# Patient Record
Sex: MALE | Race: BLACK OR AFRICAN AMERICAN | NOT HISPANIC OR LATINO | Employment: UNEMPLOYED | URBAN - METROPOLITAN AREA
[De-identification: names, ages, dates, MRNs, and addresses within clinical notes are randomized per-mention and may not be internally consistent; named-entity substitution may affect disease eponyms.]

---

## 2020-09-16 ENCOUNTER — HISTORICAL (OUTPATIENT)
Dept: FAMILY MEDICINE | Facility: CLINIC | Age: 45
End: 2020-09-16

## 2020-09-16 LAB
ABS NEUT (OLG): 2.86 X10(3)/MCL (ref 2.1–9.2)
ALBUMIN SERPL-MCNC: 3.8 GM/DL (ref 3.4–5)
ALBUMIN/GLOB SERPL: 1 RATIO (ref 1.1–2)
ALP SERPL-CCNC: 76 UNIT/L (ref 45–117)
ALT SERPL-CCNC: 39 UNIT/L (ref 12–78)
APPEARANCE, UA: CLEAR
AST SERPL-CCNC: 9 UNIT/L (ref 15–37)
BACTERIA #/AREA URNS AUTO: ABNORMAL /HPF
BASOPHILS # BLD AUTO: 0 X10(3)/MCL (ref 0–0.2)
BASOPHILS NFR BLD AUTO: 0 %
BILIRUB SERPL-MCNC: 0.3 MG/DL (ref 0.2–1)
BILIRUB UR QL STRIP: NEGATIVE
BILIRUBIN DIRECT+TOT PNL SERPL-MCNC: <0.1 MG/DL (ref 0–0.2)
BILIRUBIN DIRECT+TOT PNL SERPL-MCNC: ABNORMAL MG/DL
BUN SERPL-MCNC: 8 MG/DL (ref 7–18)
CALCIUM SERPL-MCNC: 9.2 MG/DL (ref 8.5–10.1)
CHLORIDE SERPL-SCNC: 105 MMOL/L (ref 98–107)
CHOLEST SERPL-MCNC: 261 MG/DL
CHOLEST/HDLC SERPL: 8.2 {RATIO} (ref 0–5)
CO2 SERPL-SCNC: 26 MMOL/L (ref 21–32)
COLOR UR: ABNORMAL
CREAT SERPL-MCNC: 0.9 MG/DL (ref 0.6–1.3)
CREAT UR-MCNC: 122 MG/DL
EOSINOPHIL # BLD AUTO: 0.2 X10(3)/MCL (ref 0–0.9)
EOSINOPHIL NFR BLD AUTO: 2 %
ERYTHROCYTE [DISTWIDTH] IN BLOOD BY AUTOMATED COUNT: 12.6 % (ref 11.5–14.5)
EST. AVERAGE GLUCOSE BLD GHB EST-MCNC: 223 MG/DL
GLOBULIN SER-MCNC: 3.7 GM/ML (ref 2.3–3.5)
GLUCOSE (UA): 150 MG/DL
GLUCOSE SERPL-MCNC: 182 MG/DL (ref 74–106)
HBA1C MFR BLD: 9.4 % (ref 4.2–6.3)
HCT VFR BLD AUTO: 38.7 % (ref 40–51)
HDLC SERPL-MCNC: 32 MG/DL (ref 40–59)
HGB BLD-MCNC: 12.9 GM/DL (ref 13.5–17.5)
HGB UR QL STRIP: NEGATIVE
HYALINE CASTS #/AREA URNS LPF: 0 /LPF
IMM GRANULOCYTES # BLD AUTO: 0.01 10*3/UL
IMM GRANULOCYTES NFR BLD AUTO: 0 %
KETONES UR QL STRIP: NEGATIVE
LDLC SERPL CALC-MCNC: 201 MG/DL
LEUKOCYTE ESTERASE UR QL STRIP: NEGATIVE
LYMPHOCYTES # BLD AUTO: 2.8 X10(3)/MCL (ref 0.6–4.6)
LYMPHOCYTES NFR BLD AUTO: 43 %
MCH RBC QN AUTO: 28.3 PG (ref 26–34)
MCHC RBC AUTO-ENTMCNC: 33.3 GM/DL (ref 31–37)
MCV RBC AUTO: 84.9 FL (ref 80–100)
MICROALBUMIN UR-MCNC: 6.9 MG/L (ref 0–19)
MICROALBUMIN/CREAT RATIO PNL UR: 5.7 MCG/MG CR (ref 0–29)
MONOCYTES # BLD AUTO: 0.6 X10(3)/MCL (ref 0.1–1.3)
MONOCYTES NFR BLD AUTO: 9 %
MUCOUS THREADS URNS QL MICRO: ABNORMAL
NEUTROPHILS # BLD AUTO: 2.86 X10(3)/MCL (ref 2.1–9.2)
NEUTROPHILS NFR BLD AUTO: 45 %
NITRITE UR QL STRIP: NEGATIVE
PH UR STRIP: 5.5 [PH] (ref 4.5–8)
PLATELET # BLD AUTO: 325 X10(3)/MCL (ref 130–400)
PMV BLD AUTO: 9.5 FL (ref 7.4–10.4)
POTASSIUM SERPL-SCNC: 4.3 MMOL/L (ref 3.5–5.1)
PROT SERPL-MCNC: 7.5 GM/DL (ref 6.4–8.2)
PROT UR QL STRIP: NEGATIVE
RBC # BLD AUTO: 4.56 X10(6)/MCL (ref 4.5–5.9)
RBC #/AREA URNS AUTO: ABNORMAL /HPF
SODIUM SERPL-SCNC: 138 MMOL/L (ref 136–145)
SP GR UR STRIP: 1.01 (ref 1–1.03)
SQUAMOUS #/AREA URNS LPF: ABNORMAL /LPF
T4 FREE SERPL-MCNC: 0.91 NG/DL (ref 0.76–1.46)
TRIGL SERPL-MCNC: 142 MG/DL
TSH SERPL-ACNC: 1.04 MIU/L (ref 0.36–3.74)
UROBILINOGEN UR STRIP-ACNC: NORMAL
VLDLC SERPL CALC-MCNC: 28 MG/DL
WBC # SPEC AUTO: 6.4 X10(3)/MCL (ref 4.5–11)
WBC #/AREA URNS AUTO: ABNORMAL /HPF

## 2020-09-29 ENCOUNTER — HISTORICAL (OUTPATIENT)
Dept: ADMINISTRATIVE | Facility: HOSPITAL | Age: 45
End: 2020-09-29

## 2020-09-29 LAB
ABS NEUT (OLG): 2.72 X10(3)/MCL (ref 2.1–9.2)
BASOPHILS # BLD AUTO: 0 X10(3)/MCL (ref 0–0.2)
BASOPHILS NFR BLD AUTO: 0 %
EOSINOPHIL # BLD AUTO: 0.2 X10(3)/MCL (ref 0–0.9)
EOSINOPHIL NFR BLD AUTO: 2 %
ERYTHROCYTE [DISTWIDTH] IN BLOOD BY AUTOMATED COUNT: 12.8 % (ref 11.5–14.5)
HCT VFR BLD AUTO: 39.7 % (ref 40–51)
HGB BLD-MCNC: 13.3 GM/DL (ref 13.5–17.5)
IMM GRANULOCYTES # BLD AUTO: 0.01 10*3/UL
IMM GRANULOCYTES NFR BLD AUTO: 0 %
LYMPHOCYTES # BLD AUTO: 2.6 X10(3)/MCL (ref 0.6–4.6)
LYMPHOCYTES NFR BLD AUTO: 43 %
MCH RBC QN AUTO: 28.2 PG (ref 26–34)
MCHC RBC AUTO-ENTMCNC: 33.5 GM/DL (ref 31–37)
MCV RBC AUTO: 84.3 FL (ref 80–100)
MONOCYTES # BLD AUTO: 0.6 X10(3)/MCL (ref 0.1–1.3)
MONOCYTES NFR BLD AUTO: 10 %
NEUTROPHILS # BLD AUTO: 2.72 X10(3)/MCL (ref 2.1–9.2)
NEUTROPHILS NFR BLD AUTO: 44 %
PLATELET # BLD AUTO: 337 X10(3)/MCL (ref 130–400)
PMV BLD AUTO: 10.1 FL (ref 7.4–10.4)
RBC # BLD AUTO: 4.71 X10(6)/MCL (ref 4.5–5.9)
WBC # SPEC AUTO: 6.2 X10(3)/MCL (ref 4.5–11)

## 2020-11-10 ENCOUNTER — HISTORICAL (OUTPATIENT)
Dept: ADMINISTRATIVE | Facility: HOSPITAL | Age: 45
End: 2020-11-10

## 2020-11-10 LAB
ABS NEUT (OLG): 2.67 X10(3)/MCL (ref 2.1–9.2)
BASOPHILS # BLD AUTO: 0 X10(3)/MCL (ref 0–0.2)
BASOPHILS NFR BLD AUTO: 0 %
CHOLEST SERPL-MCNC: 178 MG/DL
CHOLEST/HDLC SERPL: 6 {RATIO} (ref 0–5)
EOSINOPHIL # BLD AUTO: 0.2 X10(3)/MCL (ref 0–0.9)
EOSINOPHIL NFR BLD AUTO: 3 %
ERYTHROCYTE [DISTWIDTH] IN BLOOD BY AUTOMATED COUNT: 13.6 % (ref 11.5–14.5)
EST. AVERAGE GLUCOSE BLD GHB EST-MCNC: 203 MG/DL
FERRITIN SERPL-MCNC: 363.74 NG/ML (ref 21.81–274.66)
FOLATE SERPL-MCNC: 11.2 NG/ML (ref 7–31.4)
HBA1C MFR BLD: 8.7 %
HCT VFR BLD AUTO: 40.8 % (ref 40–51)
HDLC SERPL-MCNC: 29 MG/DL (ref 35–60)
HGB BLD-MCNC: 13.4 GM/DL (ref 13.5–17.5)
IMM GRANULOCYTES # BLD AUTO: 0.01 10*3/UL
IMM GRANULOCYTES NFR BLD AUTO: 0 %
IRON SATN MFR SERPL: 24 % (ref 20–50)
IRON SERPL-MCNC: 69 UG/DL (ref 65–175)
LDLC SERPL CALC-MCNC: 116 MG/DL (ref 50–140)
LYMPHOCYTES # BLD AUTO: 2.5 X10(3)/MCL (ref 0.6–4.6)
LYMPHOCYTES NFR BLD AUTO: 42 %
MCH RBC QN AUTO: 27.9 PG (ref 26–34)
MCHC RBC AUTO-ENTMCNC: 32.8 GM/DL (ref 31–37)
MCV RBC AUTO: 84.8 FL (ref 80–100)
MONOCYTES # BLD AUTO: 0.5 X10(3)/MCL (ref 0.1–1.3)
MONOCYTES NFR BLD AUTO: 9 %
NEUTROPHILS # BLD AUTO: 2.67 X10(3)/MCL (ref 2.1–9.2)
NEUTROPHILS NFR BLD AUTO: 45 %
PLATELET # BLD AUTO: 367 X10(3)/MCL (ref 130–400)
PMV BLD AUTO: 9.7 FL (ref 7.4–10.4)
RBC # BLD AUTO: 4.81 X10(6)/MCL (ref 4.5–5.9)
RET# (OHS): 0.05 X10(6)/MCL (ref 0.02–0.09)
RETICULOCYTE COUNT AUTOMATED (OLG): 1 % (ref 0.5–1.5)
TIBC SERPL-MCNC: 215 UG/DL (ref 69–240)
TIBC SERPL-MCNC: 284 UG/DL (ref 250–450)
TRANSFERRIN SERPL-MCNC: 255 MG/DL (ref 174–364)
TRIGL SERPL-MCNC: 163 MG/DL (ref 34–140)
VLDLC SERPL CALC-MCNC: 33 MG/DL
WBC # SPEC AUTO: 5.9 X10(3)/MCL (ref 4.5–11)

## 2021-06-21 ENCOUNTER — HISTORICAL (OUTPATIENT)
Dept: FAMILY MEDICINE | Facility: CLINIC | Age: 46
End: 2021-06-21

## 2021-07-29 ENCOUNTER — HISTORICAL (OUTPATIENT)
Dept: ADMINISTRATIVE | Facility: HOSPITAL | Age: 46
End: 2021-07-29

## 2021-07-29 LAB
CREAT UR-MCNC: 37.6 MG/DL (ref 58–161)
MICROALBUMIN UR-MCNC: <5 MG/L
MICROALBUMIN/CREAT RATIO PNL UR: <13.3 MG/GM CR (ref 0–30)

## 2021-08-02 ENCOUNTER — HISTORICAL (OUTPATIENT)
Dept: FAMILY MEDICINE | Facility: CLINIC | Age: 46
End: 2021-08-02

## 2021-08-02 LAB
ABS NEUT (OLG): 2.21 X10(3)/MCL (ref 2.1–9.2)
ALBUMIN SERPL-MCNC: 4.5 GM/DL (ref 3.5–5)
ALBUMIN/GLOB SERPL: 1.2 RATIO (ref 1.1–2)
ALP SERPL-CCNC: 67 UNIT/L (ref 40–150)
ALT SERPL-CCNC: 59 UNIT/L (ref 0–55)
AST SERPL-CCNC: 23 UNIT/L (ref 5–34)
BASOPHILS # BLD AUTO: 0 X10(3)/MCL (ref 0–0.2)
BASOPHILS NFR BLD AUTO: 0 %
BILIRUB SERPL-MCNC: 0.5 MG/DL
BILIRUBIN DIRECT+TOT PNL SERPL-MCNC: 0.2 MG/DL (ref 0–0.5)
BILIRUBIN DIRECT+TOT PNL SERPL-MCNC: 0.3 MG/DL (ref 0–0.8)
BUN SERPL-MCNC: 14 MG/DL (ref 8.9–20.6)
CALCIUM SERPL-MCNC: 10.5 MG/DL (ref 8.4–10.2)
CHLORIDE SERPL-SCNC: 102 MMOL/L (ref 98–107)
CHOLEST SERPL-MCNC: 205 MG/DL
CHOLEST/HDLC SERPL: 8 {RATIO} (ref 0–5)
CO2 SERPL-SCNC: 31 MMOL/L (ref 22–29)
CREAT SERPL-MCNC: 1.17 MG/DL (ref 0.73–1.18)
EOSINOPHIL # BLD AUTO: 0.1 X10(3)/MCL (ref 0–0.9)
EOSINOPHIL NFR BLD AUTO: 2 %
ERYTHROCYTE [DISTWIDTH] IN BLOOD BY AUTOMATED COUNT: 14 % (ref 11.5–14.5)
EST. AVERAGE GLUCOSE BLD GHB EST-MCNC: 231.7 MG/DL
GLOBULIN SER-MCNC: 3.7 GM/DL (ref 2.4–3.5)
GLUCOSE SERPL-MCNC: 144 MG/DL (ref 74–100)
HBA1C MFR BLD: 9.7 %
HCT VFR BLD AUTO: 43.5 % (ref 40–51)
HDLC SERPL-MCNC: 27 MG/DL (ref 35–60)
HGB BLD-MCNC: 14.5 GM/DL (ref 13.5–17.5)
IMM GRANULOCYTES # BLD AUTO: 0.01 10*3/UL
IMM GRANULOCYTES NFR BLD AUTO: 0 %
LDLC SERPL CALC-MCNC: 147 MG/DL (ref 50–140)
LYMPHOCYTES # BLD AUTO: 2.7 X10(3)/MCL (ref 0.6–4.6)
LYMPHOCYTES NFR BLD AUTO: 48 %
MCH RBC QN AUTO: 28.5 PG (ref 26–34)
MCHC RBC AUTO-ENTMCNC: 33.3 GM/DL (ref 31–37)
MCV RBC AUTO: 85.5 FL (ref 80–100)
MONOCYTES # BLD AUTO: 0.6 X10(3)/MCL (ref 0.1–1.3)
MONOCYTES NFR BLD AUTO: 10 %
NEUTROPHILS # BLD AUTO: 2.21 X10(3)/MCL (ref 2.1–9.2)
NEUTROPHILS NFR BLD AUTO: 39 %
NRBC BLD AUTO-RTO: 0 % (ref 0–0.2)
PLATELET # BLD AUTO: 308 X10(3)/MCL (ref 130–400)
PMV BLD AUTO: 9.4 FL (ref 7.4–10.4)
POTASSIUM SERPL-SCNC: 4.7 MMOL/L (ref 3.5–5.1)
PROT SERPL-MCNC: 8.2 GM/DL (ref 6.4–8.3)
RBC # BLD AUTO: 5.09 X10(6)/MCL (ref 4.5–5.9)
SODIUM SERPL-SCNC: 142 MMOL/L (ref 136–145)
TRIGL SERPL-MCNC: 155 MG/DL (ref 34–140)
VLDLC SERPL CALC-MCNC: 31 MG/DL
WBC # SPEC AUTO: 5.7 X10(3)/MCL (ref 4.5–11)

## 2021-09-28 ENCOUNTER — HISTORICAL (OUTPATIENT)
Dept: FAMILY MEDICINE | Facility: CLINIC | Age: 46
End: 2021-09-28

## 2021-09-28 LAB
CHOLEST SERPL-MCNC: 180 MG/DL
CHOLEST/HDLC SERPL: 6 {RATIO} (ref 0–5)
HDLC SERPL-MCNC: 28 MG/DL (ref 35–60)
LDLC SERPL CALC-MCNC: 119 MG/DL (ref 50–140)
TRIGL SERPL-MCNC: 166 MG/DL (ref 34–140)
VLDLC SERPL CALC-MCNC: 33 MG/DL

## 2021-09-30 ENCOUNTER — HISTORICAL (OUTPATIENT)
Dept: FAMILY MEDICINE | Facility: CLINIC | Age: 46
End: 2021-09-30

## 2021-09-30 LAB
CHOLEST SERPL-MCNC: 179 MG/DL
CHOLEST/HDLC SERPL: 7 {RATIO} (ref 0–5)
EST. AVERAGE GLUCOSE BLD GHB EST-MCNC: 185.8 MG/DL
HBA1C MFR BLD: 8.1 %
HDLC SERPL-MCNC: 25 MG/DL (ref 35–60)
LDLC SERPL CALC-MCNC: 105 MG/DL (ref 50–140)
TRIGL SERPL-MCNC: 247 MG/DL (ref 34–140)
VLDLC SERPL CALC-MCNC: 49 MG/DL

## 2021-11-05 ENCOUNTER — HISTORICAL (OUTPATIENT)
Dept: FAMILY MEDICINE | Facility: CLINIC | Age: 46
End: 2021-11-05

## 2021-11-05 LAB
EST. AVERAGE GLUCOSE BLD GHB EST-MCNC: 168.6 MG/DL
HBA1C MFR BLD: 7.5 %

## 2021-12-15 ENCOUNTER — HISTORICAL (OUTPATIENT)
Dept: ADMINISTRATIVE | Facility: HOSPITAL | Age: 46
End: 2021-12-15

## 2021-12-15 LAB
ABS NEUT (OLG): 1.71 X10(3)/MCL (ref 2.1–9.2)
ALBUMIN SERPL-MCNC: 4.2 GM/DL (ref 3.5–5)
ALBUMIN/GLOB SERPL: 1.3 RATIO (ref 1.1–2)
ALP SERPL-CCNC: 63 UNIT/L (ref 40–150)
ALT SERPL-CCNC: 34 UNIT/L (ref 0–55)
AST SERPL-CCNC: 19 UNIT/L (ref 5–34)
BASOPHILS # BLD AUTO: 0 X10(3)/MCL (ref 0–0.2)
BASOPHILS NFR BLD AUTO: 1 %
BILIRUB SERPL-MCNC: 0.3 MG/DL
BILIRUBIN DIRECT+TOT PNL SERPL-MCNC: 0.1 MG/DL (ref 0–0.5)
BILIRUBIN DIRECT+TOT PNL SERPL-MCNC: 0.2 MG/DL (ref 0–0.8)
BUN SERPL-MCNC: 13.4 MG/DL (ref 8.9–20.6)
CALCIUM SERPL-MCNC: 9.9 MG/DL (ref 8.7–10.5)
CHLORIDE SERPL-SCNC: 104 MMOL/L (ref 98–107)
CHOLEST SERPL-MCNC: 155 MG/DL
CHOLEST/HDLC SERPL: 6 {RATIO} (ref 0–5)
CO2 SERPL-SCNC: 26 MMOL/L (ref 22–29)
CREAT SERPL-MCNC: 1.26 MG/DL (ref 0.73–1.18)
CREAT UR-MCNC: 66.7 MG/DL (ref 58–161)
EOSINOPHIL # BLD AUTO: 0.2 X10(3)/MCL (ref 0–0.9)
EOSINOPHIL NFR BLD AUTO: 3 %
ERYTHROCYTE [DISTWIDTH] IN BLOOD BY AUTOMATED COUNT: 14.4 % (ref 11.5–14.5)
EST CREAT CLEARANCE SER (OHS): 62.11 ML/MIN
EST. AVERAGE GLUCOSE BLD GHB EST-MCNC: 145.6 MG/DL
GLOBULIN SER-MCNC: 3.3 GM/DL (ref 2.4–3.5)
GLUCOSE SERPL-MCNC: 114 MG/DL (ref 74–100)
HBA1C MFR BLD: 6.7 %
HCT VFR BLD AUTO: 40.5 % (ref 40–51)
HDLC SERPL-MCNC: 28 MG/DL (ref 35–60)
HGB BLD-MCNC: 13.5 GM/DL (ref 13.5–17.5)
IMM GRANULOCYTES # BLD AUTO: 0.01 10*3/UL
IMM GRANULOCYTES NFR BLD AUTO: 0 %
LDLC SERPL CALC-MCNC: 96 MG/DL (ref 50–140)
LYMPHOCYTES # BLD AUTO: 2.9 X10(3)/MCL (ref 0.6–4.6)
LYMPHOCYTES NFR BLD AUTO: 54 %
MCH RBC QN AUTO: 27.4 PG (ref 26–34)
MCHC RBC AUTO-ENTMCNC: 33.3 GM/DL (ref 31–37)
MCV RBC AUTO: 82.3 FL (ref 80–100)
MICROALBUMIN UR-MCNC: 20 MG/L
MICROALBUMIN/CREAT RATIO PNL UR: 30 MG/GM CR (ref 0–30)
MONOCYTES # BLD AUTO: 0.6 X10(3)/MCL (ref 0.1–1.3)
MONOCYTES NFR BLD AUTO: 11 %
NEUTROPHILS # BLD AUTO: 1.71 X10(3)/MCL (ref 2.1–9.2)
NEUTROPHILS NFR BLD AUTO: 32 %
NRBC BLD AUTO-RTO: 0 % (ref 0–0.2)
PLATELET # BLD AUTO: 323 X10(3)/MCL (ref 130–400)
PMV BLD AUTO: 9.4 FL (ref 7.4–10.4)
POTASSIUM SERPL-SCNC: 4.2 MMOL/L (ref 3.5–5.1)
PROT SERPL-MCNC: 7.5 GM/DL (ref 6.4–8.3)
RBC # BLD AUTO: 4.92 X10(6)/MCL (ref 4.5–5.9)
SODIUM SERPL-SCNC: 140 MMOL/L (ref 136–145)
TRIGL SERPL-MCNC: 153 MG/DL (ref 34–140)
VLDLC SERPL CALC-MCNC: 31 MG/DL
WBC # SPEC AUTO: 5.4 X10(3)/MCL (ref 4.5–11)

## 2022-04-09 ENCOUNTER — HISTORICAL (OUTPATIENT)
Dept: ADMINISTRATIVE | Facility: HOSPITAL | Age: 47
End: 2022-04-09

## 2022-04-25 ENCOUNTER — HISTORICAL (OUTPATIENT)
Dept: FAMILY MEDICINE | Facility: CLINIC | Age: 47
End: 2022-04-25

## 2022-04-25 LAB
ABS NEUT (OLG): 1.87 (ref 2.1–9.2)
ALBUMIN SERPL-MCNC: 4.1 G/DL (ref 3.5–5)
ALBUMIN/GLOB SERPL: 1.2 {RATIO} (ref 1.1–2)
ALP SERPL-CCNC: 76 U/L (ref 40–150)
ALT SERPL-CCNC: 38 U/L (ref 0–55)
APPEARANCE, UA: CLEAR
AST SERPL-CCNC: 15 U/L (ref 5–34)
BACTERIA SPEC CULT: NORMAL
BASOPHILS # BLD AUTO: 0 10*3/UL (ref 0–0.2)
BASOPHILS NFR BLD AUTO: 1 %
BILIRUB SERPL-MCNC: 0.4 MG/DL
BILIRUB UR QL STRIP: NEGATIVE
BILIRUBIN DIRECT+TOT PNL SERPL-MCNC: 0.1 (ref 0–0.5)
BILIRUBIN DIRECT+TOT PNL SERPL-MCNC: 0.3 (ref 0–0.8)
BUN SERPL-MCNC: 13.4 MG/DL (ref 8.9–20.6)
CALCIUM SERPL-MCNC: 9.9 MG/DL (ref 8.7–10.5)
CHLORIDE SERPL-SCNC: 107 MMOL/L (ref 98–107)
CHOLEST SERPL-MCNC: 211 MG/DL
CHOLEST/HDLC SERPL: 6 {RATIO} (ref 0–5)
CO2 SERPL-SCNC: 24 MMOL/L (ref 22–29)
COLOR UR: COLORLESS
CREAT SERPL-MCNC: 0.97 MG/DL (ref 0.73–1.18)
CREAT UR-MCNC: 63.6 MG/DL (ref 58–161)
EOSINOPHIL # BLD AUTO: 0.3 10*3/UL (ref 0–0.9)
EOSINOPHIL NFR BLD AUTO: 5 %
ERYTHROCYTE [DISTWIDTH] IN BLOOD BY AUTOMATED COUNT: 15.5 % (ref 11.5–14.5)
EST. AVERAGE GLUCOSE BLD GHB EST-MCNC: 145.6 MG/DL
FLAG2 (OHS): 60
FLAG3 (OHS): 80
FLAGS (OHS): 90
GLOBULIN SER-MCNC: 3.4 G/DL (ref 2.4–3.5)
GLUCOSE (UA): NORMAL
GLUCOSE SERPL-MCNC: 113 MG/DL (ref 74–100)
HBA1C MFR BLD: 6.7 %
HCT VFR BLD AUTO: 41.6 % (ref 40–51)
HDLC SERPL-MCNC: 34 MG/DL (ref 35–60)
HEMOLYSIS INTERF INDEX SERPL-ACNC: 2
HGB BLD-MCNC: 13.7 G/DL (ref 13.5–17.5)
HGB UR QL STRIP: NEGATIVE
HYALINE CASTS #/AREA URNS LPF: NORMAL /[LPF]
ICTERIC INTERF INDEX SERPL-ACNC: 1
IMM GRANULOCYTES # BLD AUTO: 0.01 10*3/UL
IMM GRANULOCYTES NFR BLD AUTO: 0 %
IMM. NE 2 SUSPECT FLAG (OHS): 10
KETONES UR QL STRIP: NEGATIVE
LDLC SERPL CALC-MCNC: 148 MG/DL (ref 50–140)
LEUKOCYTE ESTERASE UR QL STRIP: NEGATIVE
LIPEMIC INTERF INDEX SERPL-ACNC: 10
LOW EVENT # SUSPECT FLAG (OHS): 90
LYMPHOCYTES # BLD AUTO: 3.4 10*3/UL (ref 0.6–4.6)
LYMPHOCYTES NFR BLD AUTO: 55 %
MANUAL DIFF? (OHS): NO
MCH RBC QN AUTO: 26.8 PG (ref 26–34)
MCHC RBC AUTO-ENTMCNC: 32.9 G/DL (ref 31–37)
MCV RBC AUTO: 81.3 FL (ref 80–100)
MICROALBUMIN UR-MCNC: <5
MICROALBUMIN/CREAT RATIO PNL UR: <7.9 (ref 0–30)
MO BLASTS SUSPECT FLAG (OHS): 80
MONOCYTES # BLD AUTO: 0.5 10*3/UL (ref 0.1–1.3)
MONOCYTES NFR BLD AUTO: 8 %
NEUTROPHILS # BLD AUTO: 1.87 10*3/UL (ref 2.1–9.2)
NEUTROPHILS NFR BLD AUTO: 31 %
NITRITE UR QL STRIP: NEGATIVE
NRBC BLD AUTO-RTO: 0 % (ref 0–0.2)
PH UR STRIP: 5 [PH] (ref 4.5–8)
PLATELET # BLD AUTO: 399 10*3/UL (ref 130–400)
PLATELET CLUMPS SUSPECT FLAG (OHS): 10
PMV BLD AUTO: 9.3 FL (ref 7.4–10.4)
POTASSIUM SERPL-SCNC: 4.5 MMOL/L (ref 3.5–5.1)
PROT SERPL-MCNC: 7.5 G/DL (ref 6.4–8.3)
PROT UR QL STRIP: NEGATIVE
RBC # BLD AUTO: 5.12 10*6/UL (ref 4.5–5.9)
RBC #/AREA URNS HPF: NORMAL /[HPF] (ref 0–5)
SODIUM SERPL-SCNC: 138 MMOL/L (ref 136–145)
SP GR UR STRIP: 1.02 (ref 1–1.03)
SQUAMOUS EPITHELIAL, UA: NORMAL
TRIGL SERPL-MCNC: 143 MG/DL (ref 34–140)
TSH SERPL-ACNC: 1.04 M[IU]/L (ref 0.35–4.94)
UROBILINOGEN UR STRIP-ACNC: NORMAL
VLDLC SERPL CALC-MCNC: 29 MG/DL
WBC # SPEC AUTO: 6.1 10*3/UL (ref 4.5–11)
WBC #/AREA URNS HPF: NORMAL /[HPF] (ref 0–5)

## 2022-04-27 VITALS
OXYGEN SATURATION: 98 % | WEIGHT: 196.19 LBS | BODY MASS INDEX: 33.49 KG/M2 | DIASTOLIC BLOOD PRESSURE: 83 MMHG | SYSTOLIC BLOOD PRESSURE: 126 MMHG | HEIGHT: 64 IN

## 2022-08-23 DIAGNOSIS — Z12.11 SCREENING FOR COLON CANCER: Primary | ICD-10-CM

## 2022-08-23 DIAGNOSIS — E11.69 TYPE 2 DIABETES MELLITUS WITH OTHER SPECIFIED COMPLICATION, WITH LONG-TERM CURRENT USE OF INSULIN: Primary | ICD-10-CM

## 2022-08-23 DIAGNOSIS — Z79.4 TYPE 2 DIABETES MELLITUS WITH OTHER SPECIFIED COMPLICATION, WITH LONG-TERM CURRENT USE OF INSULIN: Primary | ICD-10-CM

## 2022-08-23 RX ORDER — SIMVASTATIN 40 MG/1
40 TABLET, FILM COATED ORAL NIGHTLY
COMMUNITY
Start: 2022-07-11 | End: 2022-08-24 | Stop reason: SDUPTHER

## 2022-08-23 RX ORDER — LIRAGLUTIDE 6 MG/ML
INJECTION SUBCUTANEOUS
COMMUNITY
Start: 2022-05-26 | End: 2022-08-24 | Stop reason: SDUPTHER

## 2022-08-23 RX ORDER — CANAGLIFLOZIN AND METFORMIN HYDROCHLORIDE 150; 1000 MG/1; MG/1
1 TABLET, FILM COATED ORAL 2 TIMES DAILY
COMMUNITY
Start: 2022-05-26 | End: 2022-08-24 | Stop reason: SDUPTHER

## 2022-08-23 RX ORDER — AMLODIPINE BESYLATE 5 MG/1
5 TABLET ORAL DAILY
COMMUNITY
Start: 2022-05-26 | End: 2022-08-24 | Stop reason: SDUPTHER

## 2022-08-23 RX ORDER — ASPIRIN 81 MG/1
81 TABLET ORAL DAILY
COMMUNITY
Start: 2021-12-27 | End: 2023-03-01 | Stop reason: SDUPTHER

## 2022-08-24 ENCOUNTER — OFFICE VISIT (OUTPATIENT)
Dept: INTERNAL MEDICINE | Facility: CLINIC | Age: 47
End: 2022-08-24

## 2022-08-24 VITALS
HEIGHT: 65 IN | RESPIRATION RATE: 18 BRPM | DIASTOLIC BLOOD PRESSURE: 78 MMHG | TEMPERATURE: 98 F | BODY MASS INDEX: 30.55 KG/M2 | SYSTOLIC BLOOD PRESSURE: 120 MMHG | HEART RATE: 65 BPM | OXYGEN SATURATION: 98 % | WEIGHT: 183.38 LBS

## 2022-08-24 DIAGNOSIS — Z13.29 SCREENING FOR THYROID DISORDER: ICD-10-CM

## 2022-08-24 DIAGNOSIS — Z12.11 SCREENING FOR MALIGNANT NEOPLASM OF COLON: ICD-10-CM

## 2022-08-24 DIAGNOSIS — E78.2 MIXED HYPERLIPIDEMIA: ICD-10-CM

## 2022-08-24 DIAGNOSIS — Z11.3 ROUTINE SCREENING FOR STI (SEXUALLY TRANSMITTED INFECTION): ICD-10-CM

## 2022-08-24 DIAGNOSIS — Z11.59 SCREENING FOR VIRAL DISEASE: ICD-10-CM

## 2022-08-24 DIAGNOSIS — Z13.0 SCREENING FOR IRON DEFICIENCY ANEMIA: ICD-10-CM

## 2022-08-24 DIAGNOSIS — E11.9 TYPE 2 DIABETES MELLITUS WITHOUT COMPLICATION, WITHOUT LONG-TERM CURRENT USE OF INSULIN: Primary | ICD-10-CM

## 2022-08-24 DIAGNOSIS — Z11.4 SCREENING FOR HIV (HUMAN IMMUNODEFICIENCY VIRUS): ICD-10-CM

## 2022-08-24 DIAGNOSIS — Z12.5 SCREENING FOR PROSTATE CANCER: ICD-10-CM

## 2022-08-24 DIAGNOSIS — I10 PRIMARY HYPERTENSION: ICD-10-CM

## 2022-08-24 DIAGNOSIS — F17.210 CIGARETTE NICOTINE DEPENDENCE, UNCOMPLICATED: ICD-10-CM

## 2022-08-24 DIAGNOSIS — Z13.21 ENCOUNTER FOR VITAMIN DEFICIENCY SCREENING: ICD-10-CM

## 2022-08-24 PROBLEM — Z13.5 SCREENING FOR DIABETIC RETINOPATHY: Status: ACTIVE | Noted: 2022-08-24

## 2022-08-24 LAB — HBA1C MFR BLD: 8.5 %

## 2022-08-24 PROCEDURE — 99214 PR OFFICE/OUTPT VISIT, EST, LEVL IV, 30-39 MIN: ICD-10-PCS | Mod: S$PBB,25,,

## 2022-08-24 PROCEDURE — 83036 HEMOGLOBIN GLYCOSYLATED A1C: CPT | Mod: PBBFAC

## 2022-08-24 PROCEDURE — 99406 BEHAV CHNG SMOKING 3-10 MIN: CPT | Mod: S$PBB,,,

## 2022-08-24 PROCEDURE — 99214 OFFICE O/P EST MOD 30 MIN: CPT | Mod: PBBFAC

## 2022-08-24 PROCEDURE — 99406 PR TOBACCO USE CESSATION INTERMEDIATE 3-10 MINUTES: ICD-10-PCS | Mod: S$PBB,,,

## 2022-08-24 PROCEDURE — 99214 OFFICE O/P EST MOD 30 MIN: CPT | Mod: S$PBB,25,,

## 2022-08-24 RX ORDER — SIMVASTATIN 40 MG/1
40 TABLET, FILM COATED ORAL NIGHTLY
Qty: 90 TABLET | Refills: 1 | Status: SHIPPED | OUTPATIENT
Start: 2022-08-24 | End: 2022-12-01

## 2022-08-24 RX ORDER — LIRAGLUTIDE 6 MG/ML
0.6 INJECTION SUBCUTANEOUS DAILY
Qty: 9 ML | Refills: 1 | Status: SHIPPED | OUTPATIENT
Start: 2022-08-24 | End: 2022-12-01

## 2022-08-24 RX ORDER — AMLODIPINE BESYLATE 5 MG/1
5 TABLET ORAL DAILY
Qty: 90 TABLET | Refills: 1 | Status: SHIPPED | OUTPATIENT
Start: 2022-08-24 | End: 2022-12-01 | Stop reason: SDUPTHER

## 2022-08-24 RX ORDER — CANAGLIFLOZIN AND METFORMIN HYDROCHLORIDE 150; 1000 MG/1; MG/1
1 TABLET, FILM COATED ORAL 2 TIMES DAILY
Qty: 90 TABLET | Refills: 1 | Status: SHIPPED | OUTPATIENT
Start: 2022-08-24 | End: 2022-12-01 | Stop reason: SDUPTHER

## 2022-08-24 NOTE — ASSESSMENT & PLAN NOTE
Lab Results   Component Value Date    HGBA1C 8.5 08/24/2022     Not at goal.     Continue Victoza and Invokamet as prescribed.   Resume and follow an ADA diet.   Avoid soda, simple sweets, and limit rice/pasta/bread/starches and consume brown options when possible.    Maintain healthy weight with BMI goal <30.    Perform aerobic exercise for 150 minutes per week (or 5 days a week for 30 minutes each day).    Examine feet daily.    Obtain annual dilated eye exam.   Eye exam: 1/24/2022  Foot exam: 8/24/2022

## 2022-08-24 NOTE — PROGRESS NOTES
Subjective:       Patient ID: Forrest Benoit Jr. is a 47 y.o. male.    Chief Complaint: Establish Care, Diabetes, Medication Refill, and Hypertension    HPI   Forrest Benoit Jr. is a 47 y.o. Black male presenting in clinic today to Establish Care, Diabetes, Medication Refill, and Hypertension. Previous PCP: Dr. Norman López. PMH HTN, DM2, HLD, and ALIF. He is followed by Dr. John Juárez (neurosurgeon). [place] He is also followed by [facility].    No complaints today.    POC [id number]. Patient states he has not been following an ADA diet. He does not check his blood sugar regularly. He states he takes his medications as prescribed. Denies polyuria, polydipsia, or polyphagia. Foot exam performed today.    BP-120/78 - at goal. Does not check BP at home regularly. He states he takes medications as prescribed. Denies CP, SOB, HA, and dizziness.    Smokes cigars a few times per week. Drinks alcohol occasionally, denies illicit drug use. Denies chest pain, shortness of breath, cough, headache, dizziness, weakness, abdominal pain, nausea, vomiting, diarrhea, constipation, dysuria, depression, anxiety, SI, and HI.    Prostate Cancer Screening - No prior screening. PSA ordered. Follow up annually.   Colon Cancer Screening - FIT ordered on 8/24/2022..   Eye Exam - 1/24/2022   Vaccinations: Flu - Not currently being offered./ Tetanus - 9/15/2020 / Covid - 5/21/2021, 6/22/2021, 1/3/2022         Review of Systems   Constitutional: Negative.    HENT: Negative.    Eyes: Negative.    Respiratory: Negative.    Cardiovascular: Negative.    Gastrointestinal: Negative.    Endocrine: Negative.    Genitourinary: Negative.    Musculoskeletal: Negative.    Integumentary:  Negative.   Allergic/Immunologic: Negative.    Neurological: Negative.    Hematological: Negative.    Psychiatric/Behavioral: Negative.    All other systems reviewed and are negative.        Objective:       Physical Exam  Constitutional:       Appearance: Normal appearance. He is normal weight.   HENT:      Head: Normocephalic.      Nose: Nose normal.      Mouth/Throat:      Mouth: Mucous membranes are moist.      Pharynx: Oropharynx is clear.   Eyes:      Extraocular Movements: Extraocular movements intact.      Conjunctiva/sclera: Conjunctivae normal.      Pupils: Pupils are equal, round, and reactive to light.   Cardiovascular:      Rate and Rhythm: Normal rate and regular rhythm.      Pulses:           Dorsalis pedis pulses are 2+ on the right side and 2+ on the left side.        Posterior tibial pulses are 2+ on the right side and 2+ on the left side.      Heart sounds: Normal heart sounds.   Pulmonary:      Effort: Pulmonary effort is normal.      Breath sounds: Normal breath sounds.   Abdominal:      General: Abdomen is flat. Bowel sounds are normal.      Palpations: Abdomen is soft.   Musculoskeletal:         General: Normal range of motion.      Cervical back: Normal range of motion.   Feet:      Right foot:      Protective Sensation: 6 sites tested. 6 sites sensed.      Skin integrity: Skin integrity normal.      Toenail Condition: Right toenails are normal.      Left foot:      Protective Sensation: 6 sites tested. 6 sites sensed.      Skin integrity: Skin integrity normal.      Toenail Condition: Left toenails are normal.   Skin:     General: Skin is warm and dry.      Capillary Refill: Capillary refill takes less than 2 seconds.   Neurological:      General: No focal deficit present.      Mental Status: He is alert and oriented to person, place, and time.   Psychiatric:         Mood and Affect: Mood normal.         Assessment and Plan:       Problem List Items Addressed This Visit        Cardiac/Vascular    Mixed hyperlipidemia     Lab Results   Component Value Date    .00 04/25/2022       Lab Results   Component Value Date    TRIG 143 04/25/2022       Lab Results   Component Value Date    HDL  34 04/25/2022        Lab Results   Component Value Date    CHOL 211 04/25/2022      Continue simvastatin as prescribed.   Follow a low cholesterol, low saturated fat diet with less than 200 mg of cholesterol a day.   Avoid fried foods and high saturated fats.  Add flax seed or fish oil supplements to diet.   Increase dietary fiber.   Regular exercise improves cholesterol levels.  Physical activity 5 times a week for 30 minutes per day (or 150 minutes per week).   Stressed importance of dietary modifications.             Relevant Medications    simvastatin (ZOCOR) 40 MG tablet    Other Relevant Orders    Lipid Panel    Primary hypertension     BP- 120/78 - at goal.  Follow a low sodium (less than 2 grams of sodium per day), DASH diet.   Continue amlodipine as prescribed.  Monitor blood pressure and report any consistent values greater than 140/90 and keep a log.  Encouraged smoking cessation to aid in BP reduction and co-morbidities.   Maintain healthy weight with a BMI goal of <30.   Aerobic exercise for 150 minutes per week (or 5 days a week for 30 minutes each day).             Relevant Medications    amLODIPine (NORVASC) 5 MG tablet    Other Relevant Orders    CBC Auto Differential    Comprehensive Metabolic Panel    Microalbumin/Creatinine Ratio, Urine    Urinalysis, Reflex to Urine Culture Urine, Clean Catch       Endocrine    BMI 30.0-30.9,adult     Body mass index is 30.19 kg/m².  Goal BMI <30.  Aerobic exercise 150 minutes per week.  Avoid soda, simple sugars, sweets, excessive rice, pasta, potatoes or bread.   Choose brown options when available and portion control.  Limit fast foods and fried foods.   Choose complex carbs in moderation (ex: green, leafy vegetables, beans, oatmeal).  Eat plenty of fresh fruits and vegetables with lean meats daily.   Consider permanent healthy lifestyle changes.             Type 2 diabetes mellitus without complication, without long-term current use of insulin - Primary      Lab Results   Component Value Date    HGBA1C 8.5 08/24/2022     Not at goal.     Continue Victoza and Invokamet as prescribed.   Resume and follow an ADA diet.   Avoid soda, simple sweets, and limit rice/pasta/bread/starches and consume brown options when possible.    Maintain healthy weight with BMI goal <30.    Perform aerobic exercise for 150 minutes per week (or 5 days a week for 30 minutes each day).    Examine feet daily.    Obtain annual dilated eye exam.   Eye exam: 1/24/2022  Foot exam: 8/24/2022             Relevant Medications    INVOKAMET 150-1,000 mg Tab    VICTOZA 2-CYN 0.6 mg/0.1 mL (18 mg/3 mL) PnIj pen    Other Relevant Orders    Hemoglobin A1C, POCT (Completed)    Comprehensive Metabolic Panel    Hemoglobin A1C    Microalbumin/Creatinine Ratio, Urine    Urinalysis, Reflex to Urine Culture Urine, Clean Catch       GI    Screening for malignant neoplasm of colon     FIT kit provided, to return ASAP.  Will refer to GI if indicated.             Relevant Orders    Occult Blood, Fecal Immunoassay       Other    Cigarette nicotine dependence, uncomplicated     Smoking cessation discussed for 3 minutes.   Pt not ready to quit.  Declines referral to Smoking Cessation program.  Discussed benefits of quitting including improved health, decreased cardiac/vascular/pulmonary/stroke risks as well as saving money.               Other Visit Diagnoses     Encounter for vitamin deficiency screening        Relevant Orders    Vitamin D    Screening for thyroid disorder        Relevant Orders    T4, Free    TSH    Routine screening for STI (sexually transmitted infection)        Relevant Orders    Chlamydia/GC, PCR    SYPHILIS ANTIBODY (WITH REFLEX RPR)    Screening for iron deficiency anemia        Relevant Orders    Ferritin    Iron and TIBC    Screening for viral disease        Relevant Orders    Hepatitis Panel, Acute    Screening for HIV (human immunodeficiency virus)        Relevant Orders    HIV 1/2 Ag/Ab (4th  Gen)    Screening for prostate cancer        Relevant Orders    PSA, Screening            Virtual visit in 3 months.  RTC prn.  Labs within a week of visit.    LUCINA Simon  8/24/22

## 2022-08-24 NOTE — ASSESSMENT & PLAN NOTE
Lab Results   Component Value Date    .00 04/25/2022       Lab Results   Component Value Date    TRIG 143 04/25/2022       Lab Results   Component Value Date    HDL 34 04/25/2022        Lab Results   Component Value Date    CHOL 211 04/25/2022      Continue simvastatin as prescribed.   Follow a low cholesterol, low saturated fat diet with less than 200 mg of cholesterol a day.   Avoid fried foods and high saturated fats.  Add flax seed or fish oil supplements to diet.   Increase dietary fiber.   Regular exercise improves cholesterol levels.  Physical activity 5 times a week for 30 minutes per day (or 150 minutes per week).   Stressed importance of dietary modifications.

## 2022-08-24 NOTE — ASSESSMENT & PLAN NOTE
BP- 120/78 - at goal.  Follow a low sodium (less than 2 grams of sodium per day), DASH diet.   Continue amlodipine as prescribed.  Monitor blood pressure and report any consistent values greater than 140/90 and keep a log.  Encouraged smoking cessation to aid in BP reduction and co-morbidities.   Maintain healthy weight with a BMI goal of <30.   Aerobic exercise for 150 minutes per week (or 5 days a week for 30 minutes each day).

## 2022-08-24 NOTE — ASSESSMENT & PLAN NOTE
Body mass index is 30.19 kg/m².  Goal BMI <30.  Aerobic exercise 150 minutes per week.  Avoid soda, simple sugars, sweets, excessive rice, pasta, potatoes or bread.   Choose brown options when available and portion control.  Limit fast foods and fried foods.   Choose complex carbs in moderation (ex: green, leafy vegetables, beans, oatmeal).  Eat plenty of fresh fruits and vegetables with lean meats daily.   Consider permanent healthy lifestyle changes.

## 2022-08-30 ENCOUNTER — LAB VISIT (OUTPATIENT)
Dept: LAB | Facility: HOSPITAL | Age: 47
End: 2022-08-30

## 2022-08-30 DIAGNOSIS — Z12.11 SCREENING FOR MALIGNANT NEOPLASM OF COLON: Primary | ICD-10-CM

## 2022-08-30 PROCEDURE — 30000890 MAYO GENERIC ORDERABLE

## 2022-08-30 PROCEDURE — 82274 ASSAY TEST FOR BLOOD FECAL: CPT

## 2022-09-01 LAB — MAYO GENERIC ORDERABLE RESULT: NORMAL

## 2022-09-02 ENCOUNTER — TELEPHONE (OUTPATIENT)
Dept: INTERNAL MEDICINE | Facility: CLINIC | Age: 47
End: 2022-09-02

## 2022-09-02 NOTE — TELEPHONE ENCOUNTER
Please inform Forrest Benoit Jr. That his  FIT is Negative, will repeat in one year.    Thank you,  HORTENCIA Tobias        For any questions, please let me know.  Thank you,    MABLE TobiasC

## 2022-11-30 ENCOUNTER — LAB VISIT (OUTPATIENT)
Dept: LAB | Facility: HOSPITAL | Age: 47
End: 2022-11-30

## 2022-11-30 DIAGNOSIS — Z11.4 SCREENING FOR HIV (HUMAN IMMUNODEFICIENCY VIRUS): ICD-10-CM

## 2022-11-30 DIAGNOSIS — Z12.5 SCREENING FOR PROSTATE CANCER: ICD-10-CM

## 2022-11-30 DIAGNOSIS — Z13.0 SCREENING FOR IRON DEFICIENCY ANEMIA: ICD-10-CM

## 2022-11-30 DIAGNOSIS — Z11.3 ROUTINE SCREENING FOR STI (SEXUALLY TRANSMITTED INFECTION): ICD-10-CM

## 2022-11-30 DIAGNOSIS — Z11.59 SCREENING FOR VIRAL DISEASE: ICD-10-CM

## 2022-11-30 DIAGNOSIS — Z13.29 SCREENING FOR THYROID DISORDER: ICD-10-CM

## 2022-11-30 DIAGNOSIS — E11.9 TYPE 2 DIABETES MELLITUS WITHOUT COMPLICATION, WITHOUT LONG-TERM CURRENT USE OF INSULIN: ICD-10-CM

## 2022-11-30 DIAGNOSIS — E78.2 MIXED HYPERLIPIDEMIA: ICD-10-CM

## 2022-11-30 DIAGNOSIS — I10 PRIMARY HYPERTENSION: ICD-10-CM

## 2022-11-30 DIAGNOSIS — Z13.21 ENCOUNTER FOR VITAMIN DEFICIENCY SCREENING: ICD-10-CM

## 2022-11-30 DIAGNOSIS — I10 PRIMARY HYPERTENSION: Primary | ICD-10-CM

## 2022-11-30 LAB
ALBUMIN SERPL-MCNC: 4.3 GM/DL (ref 3.5–5)
ALBUMIN/GLOB SERPL: 1.2 RATIO (ref 1.1–2)
ALP SERPL-CCNC: 96 UNIT/L (ref 40–150)
ALT SERPL-CCNC: 54 UNIT/L (ref 0–55)
APPEARANCE UR: CLEAR
AST SERPL-CCNC: 28 UNIT/L (ref 5–34)
BACTERIA #/AREA URNS AUTO: ABNORMAL /HPF
BASOPHILS # BLD AUTO: 0.04 X10(3)/MCL (ref 0–0.2)
BASOPHILS NFR BLD AUTO: 0.7 %
BILIRUB UR QL STRIP.AUTO: NEGATIVE MG/DL
BILIRUBIN DIRECT+TOT PNL SERPL-MCNC: 0.7 MG/DL
BUN SERPL-MCNC: 12 MG/DL (ref 8.9–20.6)
C TRACH DNA SPEC QL NAA+PROBE: NOT DETECTED
CALCIUM SERPL-MCNC: 10.4 MG/DL (ref 8.4–10.2)
CHLORIDE SERPL-SCNC: 102 MMOL/L (ref 98–107)
CHOLEST SERPL-MCNC: 242 MG/DL
CHOLEST/HDLC SERPL: 7 {RATIO} (ref 0–5)
CO2 SERPL-SCNC: 28 MMOL/L (ref 22–29)
COLOR UR AUTO: COLORLESS
CREAT SERPL-MCNC: 1.19 MG/DL (ref 0.73–1.18)
CREAT UR-MCNC: 68.7 MG/DL (ref 63–166)
DEPRECATED CALCIDIOL+CALCIFEROL SERPL-MC: 32.9 NG/ML (ref 30–80)
EOSINOPHIL # BLD AUTO: 0.14 X10(3)/MCL (ref 0–0.9)
EOSINOPHIL NFR BLD AUTO: 2.3 %
ERYTHROCYTE [DISTWIDTH] IN BLOOD BY AUTOMATED COUNT: 12.9 % (ref 11.5–17)
EST. AVERAGE GLUCOSE BLD GHB EST-MCNC: 251.8 MG/DL
FERRITIN SERPL-MCNC: 181.93 NG/ML (ref 21.81–274.66)
GFR SERPLBLD CREATININE-BSD FMLA CKD-EPI: >60 MLS/MIN/1.73/M2
GLOBULIN SER-MCNC: 3.7 GM/DL (ref 2.4–3.5)
GLUCOSE SERPL-MCNC: 215 MG/DL (ref 74–100)
GLUCOSE UR QL STRIP.AUTO: ABNORMAL MG/DL
HAV IGM SERPL QL IA: NONREACTIVE
HBA1C MFR BLD: 10.4 %
HBV CORE IGM SERPL QL IA: NONREACTIVE
HBV SURFACE AG SERPL QL IA: NONREACTIVE
HCT VFR BLD AUTO: 46.2 % (ref 42–52)
HCV AB SERPL QL IA: NONREACTIVE
HDLC SERPL-MCNC: 33 MG/DL (ref 35–60)
HGB BLD-MCNC: 15.8 GM/DL (ref 14–18)
HIV 1+2 AB+HIV1 P24 AG SERPL QL IA: NONREACTIVE
HYALINE CASTS #/AREA URNS LPF: ABNORMAL /LPF
IMM GRANULOCYTES # BLD AUTO: 0.01 X10(3)/MCL (ref 0–0.04)
IMM GRANULOCYTES NFR BLD AUTO: 0.2 %
IRON SATN MFR SERPL: 36 % (ref 20–50)
IRON SERPL-MCNC: 108 UG/DL (ref 65–175)
KETONES UR QL STRIP.AUTO: NEGATIVE MG/DL
LDLC SERPL CALC-MCNC: 171 MG/DL (ref 50–140)
LEUKOCYTE ESTERASE UR QL STRIP.AUTO: NEGATIVE UNIT/L
LYMPHOCYTES # BLD AUTO: 3.46 X10(3)/MCL (ref 0.6–4.6)
LYMPHOCYTES NFR BLD AUTO: 58.1 %
MCH RBC QN AUTO: 28.6 PG (ref 27–31)
MCHC RBC AUTO-ENTMCNC: 34.2 MG/DL (ref 33–36)
MCV RBC AUTO: 83.5 FL (ref 80–94)
MICROALBUMIN UR-MCNC: <5 UG/ML
MICROALBUMIN/CREAT RATIO PNL UR: <7.3 MG/GM CR (ref 0–30)
MONOCYTES # BLD AUTO: 0.6 X10(3)/MCL (ref 0.1–1.3)
MONOCYTES NFR BLD AUTO: 10.1 %
N GONORRHOEA DNA SPEC QL NAA+PROBE: NOT DETECTED
NEUTROPHILS # BLD AUTO: 1.7 X10(3)/MCL (ref 2.1–9.2)
NEUTROPHILS NFR BLD AUTO: 28.6 %
NITRITE UR QL STRIP.AUTO: NEGATIVE
NRBC BLD AUTO-RTO: 0 %
PH UR STRIP.AUTO: 5 [PH]
PLATELET # BLD AUTO: 326 X10(3)/MCL (ref 130–400)
PMV BLD AUTO: 9.7 FL (ref 7.4–10.4)
POTASSIUM SERPL-SCNC: 4.3 MMOL/L (ref 3.5–5.1)
PROT SERPL-MCNC: 8 GM/DL (ref 6.4–8.3)
PROT UR QL STRIP.AUTO: NEGATIVE MG/DL
PSA SERPL-MCNC: 0.36 NG/ML
RBC # BLD AUTO: 5.53 X10(6)/MCL (ref 4.7–6.1)
RBC #/AREA URNS AUTO: ABNORMAL /HPF
RBC UR QL AUTO: NEGATIVE UNIT/L
SODIUM SERPL-SCNC: 141 MMOL/L (ref 136–145)
SP GR UR STRIP.AUTO: 1.02
SQUAMOUS #/AREA URNS LPF: ABNORMAL /HPF
T PALLIDUM AB SER QL: NONREACTIVE
T4 FREE SERPL-MCNC: 1.05 NG/DL (ref 0.7–1.48)
TIBC SERPL-MCNC: 189 UG/DL (ref 69–240)
TIBC SERPL-MCNC: 297 UG/DL (ref 250–450)
TRANSFERRIN SERPL-MCNC: 259 MG/DL (ref 174–364)
TRIGL SERPL-MCNC: 191 MG/DL (ref 34–140)
TSH SERPL-ACNC: 2.31 UIU/ML (ref 0.35–4.94)
UROBILINOGEN UR STRIP-ACNC: NORMAL MG/DL
VLDLC SERPL CALC-MCNC: 38 MG/DL
WBC # SPEC AUTO: 6 X10(3)/MCL (ref 4.5–11.5)
WBC #/AREA URNS AUTO: ABNORMAL /HPF

## 2022-11-30 PROCEDURE — 80061 LIPID PANEL: CPT

## 2022-11-30 PROCEDURE — 82728 ASSAY OF FERRITIN: CPT

## 2022-11-30 PROCEDURE — 81001 URINALYSIS AUTO W/SCOPE: CPT

## 2022-11-30 PROCEDURE — 84443 ASSAY THYROID STIM HORMONE: CPT

## 2022-11-30 PROCEDURE — 82043 UR ALBUMIN QUANTITATIVE: CPT

## 2022-11-30 PROCEDURE — 80053 COMPREHEN METABOLIC PANEL: CPT

## 2022-11-30 PROCEDURE — 84439 ASSAY OF FREE THYROXINE: CPT

## 2022-11-30 PROCEDURE — 85025 COMPLETE CBC W/AUTO DIFF WBC: CPT

## 2022-11-30 PROCEDURE — 80074 ACUTE HEPATITIS PANEL: CPT

## 2022-11-30 PROCEDURE — 87389 HIV-1 AG W/HIV-1&-2 AB AG IA: CPT

## 2022-11-30 PROCEDURE — 82306 VITAMIN D 25 HYDROXY: CPT

## 2022-11-30 PROCEDURE — 83540 ASSAY OF IRON: CPT

## 2022-11-30 PROCEDURE — 87491 CHLMYD TRACH DNA AMP PROBE: CPT

## 2022-11-30 PROCEDURE — 87591 N.GONORRHOEAE DNA AMP PROB: CPT

## 2022-11-30 PROCEDURE — 84153 ASSAY OF PSA TOTAL: CPT

## 2022-11-30 PROCEDURE — 86780 TREPONEMA PALLIDUM: CPT

## 2022-11-30 PROCEDURE — 83036 HEMOGLOBIN GLYCOSYLATED A1C: CPT

## 2022-11-30 PROCEDURE — 36415 COLL VENOUS BLD VENIPUNCTURE: CPT

## 2022-12-01 ENCOUNTER — OFFICE VISIT (OUTPATIENT)
Dept: INTERNAL MEDICINE | Facility: CLINIC | Age: 47
End: 2022-12-01

## 2022-12-01 DIAGNOSIS — E78.2 MIXED HYPERLIPIDEMIA: ICD-10-CM

## 2022-12-01 DIAGNOSIS — F17.210 CIGARETTE NICOTINE DEPENDENCE, UNCOMPLICATED: ICD-10-CM

## 2022-12-01 DIAGNOSIS — E11.9 TYPE 2 DIABETES MELLITUS WITHOUT COMPLICATION, WITHOUT LONG-TERM CURRENT USE OF INSULIN: ICD-10-CM

## 2022-12-01 DIAGNOSIS — I10 PRIMARY HYPERTENSION: Primary | ICD-10-CM

## 2022-12-01 PROCEDURE — 99214 PR OFFICE/OUTPT VISIT, EST, LEVL IV, 30-39 MIN: ICD-10-PCS | Mod: 95,25,,

## 2022-12-01 PROCEDURE — 99406 PR TOBACCO USE CESSATION INTERMEDIATE 3-10 MINUTES: ICD-10-PCS | Mod: 95,,,

## 2022-12-01 PROCEDURE — 99214 OFFICE O/P EST MOD 30 MIN: CPT | Mod: 95,25,,

## 2022-12-01 PROCEDURE — 99406 BEHAV CHNG SMOKING 3-10 MIN: CPT | Mod: 95,,,

## 2022-12-01 RX ORDER — AMLODIPINE BESYLATE 5 MG/1
5 TABLET ORAL DAILY
Qty: 90 TABLET | Refills: 1 | Status: SHIPPED | OUTPATIENT
Start: 2022-12-01 | End: 2023-03-01 | Stop reason: SDUPTHER

## 2022-12-01 RX ORDER — SIMVASTATIN 80 MG/1
80 TABLET, FILM COATED ORAL NIGHTLY
Qty: 90 TABLET | Refills: 1 | Status: SHIPPED | OUTPATIENT
Start: 2022-12-01 | End: 2023-03-01 | Stop reason: SDUPTHER

## 2022-12-01 RX ORDER — CANAGLIFLOZIN AND METFORMIN HYDROCHLORIDE 150; 1000 MG/1; MG/1
1 TABLET, FILM COATED ORAL 2 TIMES DAILY
Qty: 90 TABLET | Refills: 1 | Status: SHIPPED | OUTPATIENT
Start: 2022-12-01 | End: 2023-03-01 | Stop reason: SDUPTHER

## 2022-12-01 RX ORDER — LIRAGLUTIDE 6 MG/ML
1.2 INJECTION SUBCUTANEOUS DAILY
Qty: 18 ML | Refills: 1 | Status: SHIPPED | OUTPATIENT
Start: 2022-12-01 | End: 2023-03-01 | Stop reason: SDUPTHER

## 2022-12-01 NOTE — PROGRESS NOTES
VISIT DATE: 22    PATIENT NAME: Forrest Benoit Jr.  : 1975  MRN: [id number]     The patient location is: [place], [place]  The chief complaint leading to consultation is: lab review    Visit type: audio only    Audio time with patient: 15 minutes  33 minutes of total time spent on the encounter, which includes face to face time and non-face to face time preparing to see the patient (eg, review of tests), Obtaining and/or reviewing separately obtained history, Documenting clinical information in the electronic or other health record, Independently interpreting results (not separately reported) and communicating results to the patient/family/caregiver, or Care coordination (not separately reported).       Each patient to whom he or she provides medical services by telemedicine is:  (1) informed of the relationship between the physician and patient and the respective role of any other health care provider with respect to management of the patient; and (2) notified that he or she may decline to receive medical services by telemedicine and may withdraw from such care at any time.    Reason for visit / Chief Complaint:  Follow-up and Labs Only       History of Present Illness (HPI):  Forrest Benoit Jr. is a 47 y.o. Black or  male presenting virtually by audio/visual for Follow-up and Labs Only. PMH HTN, DM2, HLD, and ALIF. He is followed by Dr. John Juárez (neurosurgeon). [place] He is also followed by [facility].     All pertinent labs dated 2022 and diagnotic tests reviewed and discussed with patient.     HgbA1c-10.4. Patient states he has not been following an ADA diet. He does not check his blood sugar regularly. He states he has not been taking his medications as prescribed. Denies polyuria, polydipsia, or polyphagia.      Smokes cigars a few times per week. Drinks alcohol occasionally, denies illicit drug use. Denies chest pain,  shortness of breath, cough, headache, dizziness, weakness, abdominal pain, nausea, vomiting, diarrhea, constipation, dysuria, depression, anxiety, SI, and HI.     Prostate Cancer Screening - 11/30/2022 PSA 0.36. Follow up annually.   Colon Cancer Screening - FIT negative on 8/30/2022..   Eye Exam - 1/24/2022   Vaccinations: Flu - Annually / Tetanus - 9/15/2020 / Covid - 5/21/2021, 6/22/2021, 1/3/2022               Review of Systems     Review of Systems   Constitutional: Negative.    HENT: Negative.     Eyes: Negative.    Respiratory: Negative.     Cardiovascular: Negative.    Gastrointestinal: Negative.    Endocrine: Negative.    Genitourinary: Negative.    Musculoskeletal: Negative.    Skin: Negative.    Allergic/Immunologic: Negative.    Neurological: Negative.    Hematological: Negative.    Psychiatric/Behavioral: Negative.     All other systems reviewed and are negative.    Medical / Social / Family History     Past Medical History:   Diagnosis Date    Dependence on nicotine from cigarettes     HTN (hypertension)     Lumbar disc herniation     Mixed hyperlipidemia     Myopia, bilateral     Type 2 diabetes mellitus without complication, with long-term current use of insulin          Past Surgical History:   Procedure Laterality Date    FLUOROSCOPIC GUIDANCE FOR SPINAL INJECTION           Social History  Forrest Benoit .'s  reports that he has been smoking cigarettes and cigars. He has never used smokeless tobacco. He reports current alcohol use. He reports that he does not use drugs.    Family History  Forrest Benoit Jr.'s family history includes Diabetes type II in his father; Hypertension in his father.    Medications and Allergies     Medications  Outpatient Medications Marked as Taking for the 12/1/22 encounter (Office Visit) with LUCINA Simon   Medication Sig Dispense Refill    aspirin (ECOTRIN) 81 MG EC tablet Take 81 mg by mouth Daily.      [DISCONTINUED] amLODIPine (NORVASC) 5 MG  tablet Take 1 tablet (5 mg total) by mouth once daily. 90 tablet 1    [DISCONTINUED] INVOKAMET 150-1,000 mg Tab Take 1 tablet by mouth 2 (two) times daily. 90 tablet 1    [DISCONTINUED] simvastatin (ZOCOR) 40 MG tablet Take 1 tablet (40 mg total) by mouth nightly. 90 tablet 1    [DISCONTINUED] VICTOZA 2-CYN 0.6 mg/0.1 mL (18 mg/3 mL) PnIj pen Inject 0.6 mg into the skin once daily. 9 mL 1       Allergies  Review of patient's allergies indicates:  No Known Allergies    Physical Examination   No vitals due to virtual visit.  Physical Exam  Pulmonary:      Effort: Pulmonary effort is normal.   Neurological:      Mental Status: He is alert and oriented to person, place, and time.   Psychiatric:         Mood and Affect: Mood normal.         Thought Content: Thought content normal.         Results     Lab Results   Component Value Date    WBC 6.0 11/30/2022    RBC 5.53 11/30/2022    HGB 15.8 11/30/2022    HCT 46.2 11/30/2022    MCV 83.5 11/30/2022    MCH 28.6 11/30/2022    MCHC 34.2 11/30/2022    RDW 12.9 11/30/2022     11/30/2022    MPV 9.7 11/30/2022     Lab Results   Component Value Date     11/30/2022    K 4.3 11/30/2022    CO2 28 11/30/2022    BUN 12.0 11/30/2022    CREATININE 1.19 (H) 11/30/2022    CALCIUM 10.4 (H) 11/30/2022    ALBUMIN 4.3 11/30/2022    BILITOT 0.7 11/30/2022    ALKPHOS 96 11/30/2022    AST 28 11/30/2022    ALT 54 11/30/2022    EGFRIFAFRICA >105 04/25/2022    EGFRNONAA 89 04/25/2022     Lab Results   Component Value Date    TSH 2.3089 11/30/2022     Lab Results   Component Value Date    CHOL 242 (H) 11/30/2022    HDL 33 (L) 11/30/2022    .00 (H) 11/30/2022    TRIG 191 (H) 11/30/2022     Lab Results   Component Value Date    PHUR 5.0 04/25/2022    PROTEINUA Negative 11/30/2022    GLUCUA 4+ 04/25/2022    KETONESU Negative 04/25/2022    OCCULTUA Negative 04/25/2022    NITRITE Negative 04/25/2022    LEUKOCYTESUR Negative 11/30/2022     Lab Results   Component Value Date     CREATRANDUR 68.7 11/30/2022     Lab Results   Component Value Date    AOFMTTPY01QR 32.9 11/30/2022    FOLATE 11.2 11/10/2020     Lab Results   Component Value Date    HIV Nonreactive 11/30/2022    HEPAIGM Nonreactive 11/30/2022    HEPBCOREM Nonreactive 11/30/2022    HEPCAB Nonreactive 11/30/2022     No results found for: FITDIAG, COLOGUARD      Assessment and Plan (including Health Maintenance)     Health Maintenance Due   Topic Date Due    Eye Exam  Never done    COVID-19 Vaccine (4 - Booster for Moderna series) 02/28/2022    Colorectal Cancer Screening  Never done    Influenza Vaccine (1) 09/01/2022       Problem List Items Addressed This Visit          Cardiac/Vascular    Mixed hyperlipidemia    Current Assessment & Plan     Lab Results   Component Value Date    .00 (H) 11/30/2022       Lab Results   Component Value Date    TRIG 191 (H) 11/30/2022       Lab Results   Component Value Date    HDL 33 (L) 11/30/2022      Lab Results   Component Value Date    CHOL 242 (H) 11/30/2022      Increase simvastatin to 80 mg.  Follow a low cholesterol, low saturated fat diet with less than 200 mg of cholesterol a day.   Avoid fried foods and high saturated fats.  Add flax seed or fish oil supplements to diet.   Increase dietary fiber.   Regular exercise improves cholesterol levels.  Physical activity 5 times a week for 30 minutes per day (or 150 minutes per week).   Stressed importance of dietary modifications.          Relevant Medications    simvastatin (ZOCOR) 80 MG tablet    Other Relevant Orders    Lipid Panel    Primary hypertension - Primary    Relevant Medications    amLODIPine (NORVASC) 5 MG tablet    Other Relevant Orders    CBC Auto Differential    Comprehensive Metabolic Panel    Microalbumin/Creatinine Ratio, Urine    Urinalysis, Reflex to Urine Culture Urine, Clean Catch       Endocrine    Type 2 diabetes mellitus without complication, without long-term current use of insulin    Current Assessment & Plan      Lab Results   Component Value Date    HGBA1C 10.4 (H) 11/30/2022    HGBA1C 8.5 08/24/2022    HGBA1C 6.7 04/25/2022    HGBA1C 6.7 12/15/2021   Resume Invokamet.  Increase victoza to 1.2 mg daily.  Resume and follow an ADA diet.   Avoid soda, simple sweets, and limit rice/pasta/bread/starches and consume brown options when possible.    Maintain healthy weight with BMI goal <30.    Perform aerobic exercise for 150 minutes per week (or 5 days a week for 30 minutes each day).    Examine feet daily.    Obtain annual dilated eye exam.   Eye exam: 1/24/2022  Foot exam: 8/24/2022          Relevant Medications    INVOKAMET 150-1,000 mg Tab    VICTOZA 2-CYN 0.6 mg/0.1 mL (18 mg/3 mL) PnIj pen    Other Relevant Orders    Comprehensive Metabolic Panel    Hemoglobin A1C    Microalbumin/Creatinine Ratio, Urine    Urinalysis, Reflex to Urine Culture Urine, Clean Catch       Other    Cigarette nicotine dependence, uncomplicated    Current Assessment & Plan     Smoking cessation discussed for 3 minutes.   Pt not ready to quit.  Declines referral to Smoking Cessation program.  Discussed benefits of quitting including improved health, decreased cardiac/vascular/pulmonary/stroke risks as well as saving money.             Health Maintenance Topics with due status: Not Due       Topic Last Completion Date    TETANUS VACCINE 09/15/2020    Foot Exam 08/24/2022    Diabetes Urine Screening 11/30/2022    Lipid Panel 11/30/2022    Hemoglobin A1c 11/30/2022    Low Dose Statin 12/01/2022       Future Appointments   Date Time Provider Department Center   1/24/2023  8:45 AM [facility]    3/1/2023  8:30 AM LUCINA Simon [facility]             Signature:  LUCINA Simon  [facility]  [address] [id number]    Date of encounter: 12/1/22

## 2022-12-01 NOTE — ASSESSMENT & PLAN NOTE
Lab Results   Component Value Date    HGBA1C 10.4 (H) 11/30/2022    HGBA1C 8.5 08/24/2022    HGBA1C 6.7 04/25/2022    HGBA1C 6.7 12/15/2021     Resume Invokamet.  Increase victoza to 1.2 mg daily.  Resume and follow an ADA diet.   Avoid soda, simple sweets, and limit rice/pasta/bread/starches and consume brown options when possible.    Maintain healthy weight with BMI goal <30.    Perform aerobic exercise for 150 minutes per week (or 5 days a week for 30 minutes each day).    Examine feet daily.    Obtain annual dilated eye exam.   Eye exam: 1/24/2022  Foot exam: 8/24/2022

## 2022-12-01 NOTE — ASSESSMENT & PLAN NOTE
Lab Results   Component Value Date    .00 (H) 11/30/2022       Lab Results   Component Value Date    TRIG 191 (H) 11/30/2022       Lab Results   Component Value Date    HDL 33 (L) 11/30/2022        Lab Results   Component Value Date    CHOL 242 (H) 11/30/2022      Increase simvastatin to 80 mg.  Follow a low cholesterol, low saturated fat diet with less than 200 mg of cholesterol a day.   Avoid fried foods and high saturated fats.  Add flax seed or fish oil supplements to diet.   Increase dietary fiber.   Regular exercise improves cholesterol levels.  Physical activity 5 times a week for 30 minutes per day (or 150 minutes per week).   Stressed importance of dietary modifications.

## 2022-12-01 NOTE — ASSESSMENT & PLAN NOTE
Smoking cessation discussed for 3 minutes.   Pt not ready to quit.  Declines referral to Smoking Cessation program.  Discussed benefits of quitting including improved health, decreased cardiac/vascular/pulmonary/stroke risks as well as saving money.

## 2022-12-02 LAB — PATH REV: NORMAL

## 2023-02-28 ENCOUNTER — LAB VISIT (OUTPATIENT)
Dept: LAB | Facility: HOSPITAL | Age: 48
End: 2023-02-28

## 2023-02-28 DIAGNOSIS — E11.9 TYPE 2 DIABETES MELLITUS WITHOUT COMPLICATION, WITHOUT LONG-TERM CURRENT USE OF INSULIN: ICD-10-CM

## 2023-02-28 DIAGNOSIS — E78.2 MIXED HYPERLIPIDEMIA: ICD-10-CM

## 2023-02-28 DIAGNOSIS — I10 PRIMARY HYPERTENSION: ICD-10-CM

## 2023-02-28 LAB
ALBUMIN SERPL-MCNC: 4.1 G/DL (ref 3.5–5)
ALBUMIN/GLOB SERPL: 1.2 RATIO (ref 1.1–2)
ALP SERPL-CCNC: 89 UNIT/L (ref 40–150)
ALT SERPL-CCNC: 27 UNIT/L (ref 0–55)
AST SERPL-CCNC: 14 UNIT/L (ref 5–34)
BASOPHILS # BLD AUTO: 0.05 X10(3)/MCL (ref 0–0.2)
BASOPHILS NFR BLD AUTO: 0.7 %
BILIRUBIN DIRECT+TOT PNL SERPL-MCNC: 0.5 MG/DL
BUN SERPL-MCNC: 11.3 MG/DL (ref 8.9–20.6)
CALCIUM SERPL-MCNC: 10.1 MG/DL (ref 8.4–10.2)
CHLORIDE SERPL-SCNC: 104 MMOL/L (ref 98–107)
CHOLEST SERPL-MCNC: 197 MG/DL
CHOLEST/HDLC SERPL: 6 {RATIO} (ref 0–5)
CO2 SERPL-SCNC: 29 MMOL/L (ref 22–29)
CREAT SERPL-MCNC: 1.27 MG/DL (ref 0.73–1.18)
EOSINOPHIL # BLD AUTO: 0.15 X10(3)/MCL (ref 0–0.9)
EOSINOPHIL NFR BLD AUTO: 2.1 %
ERYTHROCYTE [DISTWIDTH] IN BLOOD BY AUTOMATED COUNT: 13.4 % (ref 11.5–17)
EST. AVERAGE GLUCOSE BLD GHB EST-MCNC: 243.2 MG/DL
GFR SERPLBLD CREATININE-BSD FMLA CKD-EPI: >60 MLS/MIN/1.73/M2
GLOBULIN SER-MCNC: 3.4 GM/DL (ref 2.4–3.5)
GLUCOSE SERPL-MCNC: 108 MG/DL (ref 74–100)
HBA1C MFR BLD: 10.1 %
HCT VFR BLD AUTO: 43.2 % (ref 42–52)
HDLC SERPL-MCNC: 35 MG/DL (ref 35–60)
HGB BLD-MCNC: 14.8 G/DL (ref 14–18)
IMM GRANULOCYTES # BLD AUTO: 0.02 X10(3)/MCL (ref 0–0.04)
IMM GRANULOCYTES NFR BLD AUTO: 0.3 %
LDLC SERPL CALC-MCNC: 133 MG/DL (ref 50–140)
LYMPHOCYTES # BLD AUTO: 3.22 X10(3)/MCL (ref 0.6–4.6)
LYMPHOCYTES NFR BLD AUTO: 46.1 %
MCH RBC QN AUTO: 29.2 PG
MCHC RBC AUTO-ENTMCNC: 34.3 G/DL (ref 33–36)
MCV RBC AUTO: 85.2 FL (ref 80–94)
MONOCYTES # BLD AUTO: 0.58 X10(3)/MCL (ref 0.1–1.3)
MONOCYTES NFR BLD AUTO: 8.3 %
NEUTROPHILS # BLD AUTO: 2.97 X10(3)/MCL (ref 2.1–9.2)
NEUTROPHILS NFR BLD AUTO: 42.5 %
NRBC BLD AUTO-RTO: 0 %
PLATELET # BLD AUTO: 319 X10(3)/MCL (ref 130–400)
PMV BLD AUTO: 9.7 FL (ref 7.4–10.4)
POTASSIUM SERPL-SCNC: 4 MMOL/L (ref 3.5–5.1)
PROT SERPL-MCNC: 7.5 GM/DL (ref 6.4–8.3)
RBC # BLD AUTO: 5.07 X10(6)/MCL (ref 4.7–6.1)
SODIUM SERPL-SCNC: 140 MMOL/L (ref 136–145)
TRIGL SERPL-MCNC: 147 MG/DL (ref 34–140)
VLDLC SERPL CALC-MCNC: 29 MG/DL
WBC # SPEC AUTO: 7 X10(3)/MCL (ref 4.5–11.5)

## 2023-02-28 PROCEDURE — 85025 COMPLETE CBC W/AUTO DIFF WBC: CPT

## 2023-02-28 PROCEDURE — 80061 LIPID PANEL: CPT

## 2023-02-28 PROCEDURE — 80053 COMPREHEN METABOLIC PANEL: CPT

## 2023-02-28 PROCEDURE — 83036 HEMOGLOBIN GLYCOSYLATED A1C: CPT

## 2023-02-28 PROCEDURE — 36415 COLL VENOUS BLD VENIPUNCTURE: CPT

## 2023-03-01 ENCOUNTER — OFFICE VISIT (OUTPATIENT)
Dept: INTERNAL MEDICINE | Facility: CLINIC | Age: 48
End: 2023-03-01

## 2023-03-01 VITALS
SYSTOLIC BLOOD PRESSURE: 113 MMHG | DIASTOLIC BLOOD PRESSURE: 80 MMHG | HEIGHT: 65 IN | WEIGHT: 181.19 LBS | RESPIRATION RATE: 18 BRPM | BODY MASS INDEX: 30.19 KG/M2 | TEMPERATURE: 98 F | OXYGEN SATURATION: 99 % | HEART RATE: 94 BPM

## 2023-03-01 DIAGNOSIS — E78.2 MIXED HYPERLIPIDEMIA: ICD-10-CM

## 2023-03-01 DIAGNOSIS — Z23 IMMUNIZATION DUE: ICD-10-CM

## 2023-03-01 DIAGNOSIS — F17.210 CIGARETTE NICOTINE DEPENDENCE, UNCOMPLICATED: ICD-10-CM

## 2023-03-01 DIAGNOSIS — E11.9 TYPE 2 DIABETES MELLITUS WITHOUT COMPLICATION, WITHOUT LONG-TERM CURRENT USE OF INSULIN: ICD-10-CM

## 2023-03-01 DIAGNOSIS — I10 PRIMARY HYPERTENSION: Primary | ICD-10-CM

## 2023-03-01 PROCEDURE — 99214 OFFICE O/P EST MOD 30 MIN: CPT | Mod: PBBFAC

## 2023-03-01 PROCEDURE — 99214 OFFICE O/P EST MOD 30 MIN: CPT | Mod: S$PBB,,,

## 2023-03-01 PROCEDURE — 90686 IIV4 VACC NO PRSV 0.5 ML IM: CPT | Mod: PBBFAC

## 2023-03-01 PROCEDURE — 99214 PR OFFICE/OUTPT VISIT, EST, LEVL IV, 30-39 MIN: ICD-10-PCS | Mod: S$PBB,,,

## 2023-03-01 RX ORDER — CANAGLIFLOZIN AND METFORMIN HYDROCHLORIDE 150; 1000 MG/1; MG/1
1 TABLET, FILM COATED ORAL 2 TIMES DAILY
Qty: 360 TABLET | Refills: 1 | Status: SHIPPED | OUTPATIENT
Start: 2023-03-01 | End: 2023-06-21 | Stop reason: SDUPTHER

## 2023-03-01 RX ORDER — SIMVASTATIN 80 MG/1
80 TABLET, FILM COATED ORAL NIGHTLY
Qty: 90 TABLET | Refills: 1 | Status: SHIPPED | OUTPATIENT
Start: 2023-03-01 | End: 2023-06-21 | Stop reason: SDUPTHER

## 2023-03-01 RX ORDER — LIRAGLUTIDE 6 MG/ML
1.2 INJECTION SUBCUTANEOUS DAILY
Qty: 18 ML | Refills: 1 | Status: SHIPPED | OUTPATIENT
Start: 2023-03-01 | End: 2023-06-21

## 2023-03-01 RX ORDER — ASPIRIN 81 MG/1
81 TABLET ORAL DAILY
Qty: 90 TABLET | Refills: 1 | Status: SHIPPED | OUTPATIENT
Start: 2023-03-01 | End: 2023-06-21 | Stop reason: SDUPTHER

## 2023-03-01 RX ORDER — CANAGLIFLOZIN AND METFORMIN HYDROCHLORIDE 150; 1000 MG/1; MG/1
2 TABLET, FILM COATED ORAL 2 TIMES DAILY
Qty: 360 TABLET | Refills: 1 | Status: SHIPPED | OUTPATIENT
Start: 2023-03-01 | End: 2023-03-01

## 2023-03-01 RX ORDER — AMLODIPINE BESYLATE 5 MG/1
5 TABLET ORAL DAILY
Qty: 90 TABLET | Refills: 1 | Status: SHIPPED | OUTPATIENT
Start: 2023-03-01 | End: 2023-06-21 | Stop reason: SDUPTHER

## 2023-03-01 RX ORDER — SIMVASTATIN 40 MG/1
40 TABLET, FILM COATED ORAL NIGHTLY
COMMUNITY
Start: 2023-02-16 | End: 2023-03-01

## 2023-03-01 RX ORDER — GLIPIZIDE 5 MG/1
5 TABLET, FILM COATED, EXTENDED RELEASE ORAL
Qty: 90 TABLET | Refills: 1 | Status: SHIPPED | OUTPATIENT
Start: 2023-03-01 | End: 2023-06-21 | Stop reason: SDUPTHER

## 2023-03-01 NOTE — PROGRESS NOTES
PATIENT NAME: Forrest Benoit Jr.  : 1975  DATE: 3/1/23  MRN: [id number]          Reason for Visit/Chief Complaint   Follow-up, Labs Only, Medication Refill, and Diabetes       History of Present Illness (HPI)     Forrest Benoit Jr. is a 47 y.o. Black male presenting in clinic today Follow-up, Labs Only, Medication Refill, and Diabetes. PMH HTN, DM2, HLD, and ALIF. He is followed by Dr. John Juárez (neurosurgeon). [place] He is also followed by [facility].     All pertinent labs dated 2023 and diagnotic tests reviewed and discussed with patient.     HgbA1c-10.1. Patient states he has not been following an ADA diet. He does not check his blood sugar regularly. He states he has not been taking his medications as prescribed. Denies polyuria, polydipsia, or polyphagia.      BP- 113/80 - at goal. Denies CP, SOB, HA, dizziness, or vision changes. Does not regularly check his blood pressure at home. No formal exercise regimen.    Smokes cigars a few times per week. Drinks alcohol occasionally, denies illicit drug use. Denies chest pain, shortness of breath, cough, headache, dizziness, weakness, abdominal pain, nausea, vomiting, diarrhea, constipation, dysuria, depression, anxiety, SI, and HI.     Prostate Cancer Screening - 2022 PSA 0.36. Follow up annually.   Colon Cancer Screening - FIT negative on 2022..   Eye Exam - 2022. Followed by [facility] eye clinic, appt: 2023.  Vaccinations: Flu - 3/1/2023 / Tetanus - 9/15/2020 / Covid - 2021, 2021, 1/3/2022     Review of Systems     Review of Systems   Constitutional: Negative.    HENT: Negative.     Eyes: Negative.    Respiratory: Negative.     Cardiovascular: Negative.    Gastrointestinal: Negative.    Endocrine: Negative.    Genitourinary: Negative.    Musculoskeletal: Negative.    Skin: Negative.    Allergic/Immunologic: Negative.    Neurological: Negative.    Hematological:  "Negative.    Psychiatric/Behavioral: Negative.     All other systems reviewed and are negative.    Medical / Social / Family History     Past Medical History:   Diagnosis Date    Dependence on nicotine from cigarettes     HTN (hypertension)     Lumbar disc herniation     Mixed hyperlipidemia     Myopia, bilateral     Type 2 diabetes mellitus without complication, with long-term current use of insulin      Past Surgical History:   Procedure Laterality Date    FLUOROSCOPIC GUIDANCE FOR SPINAL INJECTION       Social History  Forrest Benoit Jr.'s  reports that he has been smoking cigarettes and cigars. He has never used smokeless tobacco. He reports current alcohol use. He reports that he does not use drugs.    Family History  Forrest Benoit Jr.'s family history includes Diabetes type II in his father; Hypertension in his father.    Medications and Allergies     Medications  Current Outpatient Medications   Medication Instructions    amLODIPine (NORVASC) 5 mg, Oral, Daily    aspirin (ECOTRIN) 81 mg, Oral, Daily    canagliflozin-metformin (INVOKAMET) 150-1,000 mg Tab 1 tablet, Oral, 2 times daily    glipiZIDE 5 mg, Oral, With breakfast    simvastatin (ZOCOR) 80 mg, Oral, Nightly    VICTOZA 2-CYN 1.2 mg, Subcutaneous, Daily     Allergies  Review of patient's allergies indicates:  No Known Allergies    Physical Examination   Visit Vitals  /80 (BP Location: Right arm, Patient Position: Sitting, BP Method: Large (Automatic))   Pulse 94   Temp 98.2 °F (36.8 °C) (Oral)   Resp 18   Ht 5' 5" (1.651 m)   Wt 82.2 kg (181 lb 3.5 oz)   SpO2 99%   BMI 30.16 kg/m²     Physical Exam  Vitals reviewed.   Constitutional:       Appearance: Normal appearance. He is normal weight.   HENT:      Head: Normocephalic.      Nose: Nose normal.      Mouth/Throat:      Mouth: Mucous membranes are moist.      Pharynx: Oropharynx is clear.   Eyes:      Extraocular Movements: Extraocular movements intact.      Conjunctiva/sclera: " Conjunctivae normal.      Pupils: Pupils are equal, round, and reactive to light.   Cardiovascular:      Rate and Rhythm: Normal rate and regular rhythm.      Heart sounds: Normal heart sounds.   Pulmonary:      Effort: Pulmonary effort is normal.      Breath sounds: Normal breath sounds.   Abdominal:      General: Abdomen is flat. Bowel sounds are normal.      Palpations: Abdomen is soft.   Musculoskeletal:         General: Normal range of motion.      Cervical back: Normal range of motion.   Skin:     General: Skin is warm and dry.      Capillary Refill: Capillary refill takes less than 2 seconds.   Neurological:      General: No focal deficit present.      Mental Status: He is alert and oriented to person, place, and time.   Psychiatric:         Mood and Affect: Mood normal.     Results     Lab Results   Component Value Date    WBC 7.0 02/28/2023    RBC 5.07 02/28/2023    HGB 14.8 02/28/2023    HCT 43.2 02/28/2023    MCV 85.2 02/28/2023    MCH 29.2 02/28/2023    MCHC 34.3 02/28/2023    RDW 13.4 02/28/2023     02/28/2023    MPV 9.7 02/28/2023      Lab Results   Component Value Date     02/28/2023    K 4.0 02/28/2023    CHLORIDE 104 02/28/2023    CO2 29 02/28/2023    GLUCOSE 108 (H) 02/28/2023    BUN 11.3 02/28/2023    CREATININE 1.27 (H) 02/28/2023    LABPROT 7.5 02/28/2023    ALBUMIN 4.1 02/28/2023    BILITOT 0.5 02/28/2023    ALKPHOS 89 02/28/2023    AST 14 02/28/2023    ALT 27 02/28/2023    EGFRNORACEVR >60 02/28/2023     Lab Results   Component Value Date    TSH 2.3089 11/30/2022     Lab Results   Component Value Date    CHOL 197 02/28/2023    HDL 35 02/28/2023    .00 02/28/2023    TRIG 147 (H) 02/28/2023     Lab Results   Component Value Date    COLORUA Colorless (A) 02/28/2023    SGUA 1.019 02/28/2023    PROTEINUA Negative 02/28/2023    GLUCOSEUA 4+ (A) 02/28/2023    BILIRUBINUA Negative 02/28/2023    BLOODUA Negative 02/28/2023    WBCUA 0-5 02/28/2023    RBCUA 0-5 02/28/2023    BACTERIA  None Seen 02/28/2023    NITRITESUA Negative 02/28/2023    LEUKOCYTESUR Negative 02/28/2023    UROBILINOGEN Normal 02/28/2023     Lab Results   Component Value Date    CREATRANDUR 73.8 02/28/2023    MICALBCREAT <6.8 02/28/2023     Lab Results   Component Value Date    LJNBWVZY84AE 32.9 11/30/2022    FOLATE 11.2 11/10/2020     Lab Results   Component Value Date    HIV Nonreactive 11/30/2022    HEPAIGM Nonreactive 11/30/2022    HEPBCOREM Nonreactive 11/30/2022    HEPCAB Nonreactive 11/30/2022       Assessment and Plan (including Health Maintenance)     1. Primary hypertension  - amLODIPine (NORVASC) 5 MG tablet; Take 1 tablet (5 mg total) by mouth once daily.  Dispense: 90 tablet; Refill: 1  - aspirin (ECOTRIN) 81 MG EC tablet; Take 1 tablet (81 mg total) by mouth Daily.  Dispense: 90 tablet; Refill: 1  - CBC Auto Differential; Future  - Comprehensive Metabolic Panel; Future  - Microalbumin/Creatinine Ratio, Urine; Future  - Urinalysis, Reflex to Urine Culture; Future    BP Readings from Last 3 Encounters:   03/01/23 113/80   08/24/22 120/78   12/29/21 126/83      At goal.  Follow a low sodium (less than 2 grams of sodium per day), DASH diet.   Continue amlodipine as prescribed.  Monitor blood pressure and report any consistent values greater than 140/90 and keep a log.  Encouraged smoking cessation to aid in BP reduction and co-morbidities.   Maintain healthy weight with a BMI goal of <30.   Aerobic exercise for 150 minutes per week (or 5 days a week for 30 minutes each day).     2. Type 2 diabetes mellitus without complication, without long-term current use of insulin  - canagliflozin-metformin (INVOKAMET) 150-1,000 mg Tab; Take 2 tablets by mouth 2 (two) times daily.  Dispense: 360 tablet; Refill: 1  - VICTOZA 2-CYN 0.6 mg/0.1 mL (18 mg/3 mL) PnIj pen; Inject 1.2 mg into the skin once daily.  Dispense: 18 mL; Refill: 1  - Hemoglobin A1C; Future    Lab Results   Component Value Date    HGBA1C 10.1 (H) 02/28/2023     HGBA1C 10.4 (H) 11/30/2022    HGBA1C 6.7 04/25/2022    HGBA1C 6.7 12/15/2021     Not at goal.  Continue victoza invokamet as prescribed.   Rx glipizide.  Follow ADA diet.   Check blood glucose twice daily and as needed. Bring log to every office visit.  Avoid soda, simple sweets, and limit rice/pasta/bread/starches and consume brown options when possible.    Maintain healthy weight with BMI goal <30.    Perform aerobic exercise for 150 minutes per week (or 5 days a week for 30 minutes each day).    Examine feet daily.    Obtain annual dilated eye exam.   Eye exam: Appt: 4/21/2023.  Foot exam:  8/24/2022    3. Mixed hyperlipidemia  - simvastatin (ZOCOR) 80 MG tablet; Take 1 tablet (80 mg total) by mouth nightly.  Dispense: 90 tablet; Refill: 1  - Lipid Panel; Future  Lab Results   Component Value Date    .00 02/28/2023     Not at goal.  Lab Results   Component Value Date    TRIG 147 (H) 02/28/2023       Lab Results   Component Value Date    HDL 35 02/28/2023        Lab Results   Component Value Date    CHOL 197 02/28/2023      Simvastatin was increased to 80 mg at last office visit, but he was only taking 40 mg. Take Simvastatin 80 mg at bedtime - refilled.  Follow a low cholesterol, low saturated fat diet with less than 200 mg of cholesterol a day.   Avoid fried foods and high saturated fats.  Add flax seed or fish oil supplements to diet.   Increase dietary fiber.   Regular exercise improves cholesterol levels.  Physical activity 5 times a week for 30 minutes per day (or 150 minutes per week).   Stressed importance of dietary modifications.     4. Immunization due  - Influenza - Quadrivalent *Preferred* (6 months+) (PF)  Written and verbal consent obtained.  Influenza vaccine was administered.  Ok to take acetaminophen for soreness of arm.     5. Cigarette nicotine dependence, uncomplicated  Smoking cessation discussed for 3 minutes.   Pt not ready to quit.  Declines referral to Smoking Cessation  program.  Discussed benefits of quitting including improved health, decreased cardiac/vascular/pulmonary/stroke risks as well as saving money.     6. BMI 30.0-30.9,adult  Body mass index is 30.16 kg/m².  Goal BMI <30.  Aerobic exercise 150 minutes per week.  Avoid soda, simple sugars, sweets, excessive rice, pasta, potatoes or bread.   Choose brown options when available and portion control.  Limit fast foods and fried foods.   Choose complex carbs in moderation (ex: green, leafy vegetables, beans, oatmeal).  Eat plenty of fresh fruits and vegetables with lean meats daily.   Consider permanent healthy lifestyle changes.     Health Maintenance Due   Topic Date Due    Eye Exam  Never done    COVID-19 Vaccine (4 - Booster for Moderna series) 02/28/2022    Colorectal Cancer Screening  Never done     Tests to Keep You Healthy    Eye Exam: DUE  Colon Cancer Screening: ORDERED  Last Blood Pressure <= 139/89 (3/1/2023): Yes  Last HbA1c < 8 (02/28/2023): NO  Tobacco Cessation: NO      Health Maintenance Topics with due status: Not Due       Topic Last Completion Date    TETANUS VACCINE 09/15/2020    Foot Exam 08/24/2022    Diabetes Urine Screening 02/28/2023    Lipid Panel 02/28/2023    Hemoglobin A1c 02/28/2023    Low Dose Statin 03/01/2023       Future Appointments   Date Time Provider Department Center   4/21/2023  9:30 AM [facility]   6/7/2023 12:00 [facility]        Follow up in about 3 months (around 6/1/2023) for Virtual Visit, Lab review, Med check.  RTC PRN.  Labs within a week of visit.        Signature:        LUCINA Simon  [facility]  [address] [facility] [id number]    Date of encounter: 3/1/23

## 2023-05-10 ENCOUNTER — DOCUMENTATION ONLY (OUTPATIENT)
Dept: INTERNAL MEDICINE | Facility: CLINIC | Age: 48
End: 2023-05-10

## 2023-06-06 ENCOUNTER — TELEPHONE (OUTPATIENT)
Dept: INTERNAL MEDICINE | Facility: CLINIC | Age: 48
End: 2023-06-06

## 2023-06-06 DIAGNOSIS — I10 PRIMARY HYPERTENSION: ICD-10-CM

## 2023-06-06 DIAGNOSIS — E78.2 MIXED HYPERLIPIDEMIA: ICD-10-CM

## 2023-06-06 DIAGNOSIS — E11.9 TYPE 2 DIABETES MELLITUS WITHOUT COMPLICATION, WITHOUT LONG-TERM CURRENT USE OF INSULIN: ICD-10-CM

## 2023-06-06 RX ORDER — CANAGLIFLOZIN AND METFORMIN HYDROCHLORIDE 150; 1000 MG/1; MG/1
1 TABLET, FILM COATED ORAL 2 TIMES DAILY
Qty: 360 TABLET | Refills: 1 | Status: CANCELLED | OUTPATIENT
Start: 2023-06-06

## 2023-06-06 RX ORDER — LIRAGLUTIDE 6 MG/ML
1.2 INJECTION SUBCUTANEOUS DAILY
Qty: 18 ML | Refills: 1 | Status: CANCELLED | OUTPATIENT
Start: 2023-06-06

## 2023-06-06 RX ORDER — AMLODIPINE BESYLATE 5 MG/1
5 TABLET ORAL DAILY
Qty: 90 TABLET | Refills: 1 | Status: CANCELLED | OUTPATIENT
Start: 2023-06-06

## 2023-06-06 RX ORDER — SIMVASTATIN 80 MG/1
80 TABLET, FILM COATED ORAL NIGHTLY
Qty: 90 TABLET | Refills: 1 | Status: CANCELLED | OUTPATIENT
Start: 2023-06-06

## 2023-06-06 NOTE — TELEPHONE ENCOUNTER
----- Message from Janis Scott sent at 6/5/2023  1:20 PM CDT -----  Regarding: med refill  Patient r/s 06/07 appointment (father is on death bed). Requesting refills:    Amlodipine 5mg  Invokamet   Simvastatin 80mg  Victoza      LV 03/2023  FV06/21/23

## 2023-06-15 ENCOUNTER — OFFICE VISIT (OUTPATIENT)
Dept: OPHTHALMOLOGY | Facility: CLINIC | Age: 48
End: 2023-06-15
Payer: MEDICAID

## 2023-06-15 VITALS — HEIGHT: 65 IN | BODY MASS INDEX: 30.12 KG/M2 | WEIGHT: 180.75 LBS

## 2023-06-15 DIAGNOSIS — H25.10 AGE-RELATED NUCLEAR CATARACT, UNSPECIFIED LATERALITY: ICD-10-CM

## 2023-06-15 DIAGNOSIS — E11.9 DIABETES MELLITUS TYPE 2 WITHOUT RETINOPATHY: Primary | ICD-10-CM

## 2023-06-15 PROCEDURE — 99213 OFFICE O/P EST LOW 20 MIN: CPT | Mod: PBBFAC,PO | Performed by: STUDENT IN AN ORGANIZED HEALTH CARE EDUCATION/TRAINING PROGRAM

## 2023-06-15 PROCEDURE — 92250 FUNDUS PHOTOGRAPHY W/I&R: CPT | Mod: PBBFAC,PO | Performed by: OPHTHALMOLOGY

## 2023-06-15 PROCEDURE — 92250 FUNDUS PHOTOGRAPHY W/I&R: CPT | Mod: PBBFAC,PO | Performed by: STUDENT IN AN ORGANIZED HEALTH CARE EDUCATION/TRAINING PROGRAM

## 2023-06-15 NOTE — PROGRESS NOTES
HPI    RTC 1 year DFE, Pupil check, CVF prior to dilation  Only complaints is decreased close vision, reading, uses OTC cheaters  Last edited by Margot Aguilar RN on 6/15/2023  1:59 PM.            Assessment /Plan     For exam results, see Encounter Report.    Diabetes mellitus type 2 without retinopathy    Age-related nuclear cataract, unspecified laterality                       T2DM without ophthalmologic complications ou   Htn retinopathy ou   - A1c 10.3 2/2023 (was in 7s prior year)  - LDFE 6/2023  - Encouraged good BS/BP/Cholesterol control, f/u with PCP regularly, yearly DFE  - given high a1c will check back in 8-10 months with octm   - fundus photos taken 6/2023      2. Nsc  - nvc ctm       8-10 month dfe octm           RTC 8-10 months dfe ou

## 2023-06-20 ENCOUNTER — LAB VISIT (OUTPATIENT)
Dept: LAB | Facility: HOSPITAL | Age: 48
End: 2023-06-20
Payer: MEDICAID

## 2023-06-20 DIAGNOSIS — E78.2 MIXED HYPERLIPIDEMIA: ICD-10-CM

## 2023-06-20 DIAGNOSIS — I10 PRIMARY HYPERTENSION: ICD-10-CM

## 2023-06-20 DIAGNOSIS — E11.9 TYPE 2 DIABETES MELLITUS WITHOUT COMPLICATION, WITHOUT LONG-TERM CURRENT USE OF INSULIN: ICD-10-CM

## 2023-06-20 LAB
ALBUMIN SERPL-MCNC: 4.3 G/DL (ref 3.5–5)
ALBUMIN/GLOB SERPL: 1.2 RATIO (ref 1.1–2)
ALP SERPL-CCNC: 92 UNIT/L (ref 40–150)
ALT SERPL-CCNC: 29 UNIT/L (ref 0–55)
AST SERPL-CCNC: 16 UNIT/L (ref 5–34)
BASOPHILS # BLD AUTO: 0.07 X10(3)/MCL
BASOPHILS NFR BLD AUTO: 0.9 %
BILIRUBIN DIRECT+TOT PNL SERPL-MCNC: 0.4 MG/DL
BUN SERPL-MCNC: 11.1 MG/DL (ref 8.9–20.6)
CALCIUM SERPL-MCNC: 10 MG/DL (ref 8.4–10.2)
CHLORIDE SERPL-SCNC: 106 MMOL/L (ref 98–107)
CHOLEST SERPL-MCNC: 191 MG/DL
CHOLEST/HDLC SERPL: 6 {RATIO} (ref 0–5)
CO2 SERPL-SCNC: 25 MMOL/L (ref 22–29)
CREAT SERPL-MCNC: 1.23 MG/DL (ref 0.73–1.18)
EOSINOPHIL # BLD AUTO: 0.11 X10(3)/MCL (ref 0–0.9)
EOSINOPHIL NFR BLD AUTO: 1.5 %
ERYTHROCYTE [DISTWIDTH] IN BLOOD BY AUTOMATED COUNT: 13 % (ref 11.5–17)
EST. AVERAGE GLUCOSE BLD GHB EST-MCNC: 260.4 MG/DL
GFR SERPLBLD CREATININE-BSD FMLA CKD-EPI: >60 MLS/MIN/1.73/M2
GLOBULIN SER-MCNC: 3.5 GM/DL (ref 2.4–3.5)
GLUCOSE SERPL-MCNC: 125 MG/DL (ref 74–100)
HBA1C MFR BLD: 10.7 %
HCT VFR BLD AUTO: 42 % (ref 42–52)
HDLC SERPL-MCNC: 31 MG/DL (ref 35–60)
HGB BLD-MCNC: 14.9 G/DL (ref 14–18)
IMM GRANULOCYTES # BLD AUTO: 0.01 X10(3)/MCL (ref 0–0.04)
IMM GRANULOCYTES NFR BLD AUTO: 0.1 %
LDLC SERPL CALC-MCNC: 137 MG/DL (ref 50–140)
LYMPHOCYTES # BLD AUTO: 3.11 X10(3)/MCL (ref 0.6–4.6)
LYMPHOCYTES NFR BLD AUTO: 41.1 %
MCH RBC QN AUTO: 29.3 PG (ref 27–31)
MCHC RBC AUTO-ENTMCNC: 35.5 G/DL (ref 33–36)
MCV RBC AUTO: 82.5 FL (ref 80–94)
MONOCYTES # BLD AUTO: 0.6 X10(3)/MCL (ref 0.1–1.3)
MONOCYTES NFR BLD AUTO: 7.9 %
NEUTROPHILS # BLD AUTO: 3.67 X10(3)/MCL (ref 2.1–9.2)
NEUTROPHILS NFR BLD AUTO: 48.5 %
NRBC BLD AUTO-RTO: 0 %
PLATELET # BLD AUTO: 324 X10(3)/MCL (ref 130–400)
PMV BLD AUTO: 10 FL (ref 7.4–10.4)
POTASSIUM SERPL-SCNC: 4.1 MMOL/L (ref 3.5–5.1)
PROT SERPL-MCNC: 7.8 GM/DL (ref 6.4–8.3)
RBC # BLD AUTO: 5.09 X10(6)/MCL (ref 4.7–6.1)
SODIUM SERPL-SCNC: 141 MMOL/L (ref 136–145)
TRIGL SERPL-MCNC: 115 MG/DL (ref 34–140)
VLDLC SERPL CALC-MCNC: 23 MG/DL
WBC # SPEC AUTO: 7.57 X10(3)/MCL (ref 4.5–11.5)

## 2023-06-20 PROCEDURE — 80053 COMPREHEN METABOLIC PANEL: CPT

## 2023-06-20 PROCEDURE — 83036 HEMOGLOBIN GLYCOSYLATED A1C: CPT

## 2023-06-20 PROCEDURE — 85025 COMPLETE CBC W/AUTO DIFF WBC: CPT

## 2023-06-20 PROCEDURE — 80061 LIPID PANEL: CPT

## 2023-06-20 PROCEDURE — 36415 COLL VENOUS BLD VENIPUNCTURE: CPT

## 2023-06-20 NOTE — ASSESSMENT & PLAN NOTE
Lab Results   Component Value Date    .00 06/20/2023       Lab Results   Component Value Date    TRIG 115 06/20/2023       Lab Results   Component Value Date    HDL 31 (L) 06/20/2023        Lab Results   Component Value Date    CHOL 191 06/20/2023      Continue simvastatin as prescribed.   Add zetia.  Follow a low cholesterol, low saturated fat diet with less than 200 mg of cholesterol a day.   Avoid fried foods and high saturated fats.  Add flax seed or fish oil supplements to diet.   Increase dietary fiber.   Regular exercise improves cholesterol levels.  Physical activity 5 times a week for 30 minutes per day (or 150 minutes per week).   Stressed importance of dietary modifications.

## 2023-06-20 NOTE — ASSESSMENT & PLAN NOTE
Lab Results   Component Value Date    HGBA1C 10.7 (H) 06/20/2023    HGBA1C 10.1 (H) 02/28/2023    HGBA1C 10.4 (H) 11/30/2022    HGBA1C 6.7 04/25/2022     Not at goal.  Continue Invokamet and glipizide as prescribed - refilled today.  Increase victoza to 1.2 mg x4 weeks, then increase to 1.8 mg.  Follow ADA diet.   Check blood glucose twice daily and as needed. Bring log to every office visit.  Avoid soda, simple sweets, and limit rice/pasta/bread/starches and consume brown options when possible.    Maintain healthy weight with BMI goal <30.    Perform aerobic exercise for 150 minutes per week (or 5 days a week for 30 minutes each day).    Examine feet daily.    Obtain annual dilated eye exam.   Kidney Protection: Rx losartan.  Statin Therapy: Simvastatin  Eye exam: 6/16/2023  Foot exam:  8/24/2023

## 2023-06-20 NOTE — PROGRESS NOTES
VISIT DATE: 23    PATIENT NAME: Forrest Benoit Jr.  : 1975  MRN: [id number]     The patient location is: [place], [place]  The chief complaint leading to consultation is: Diabetes, HLD, lab review.    Visit type: audiovisual    Face to Face time with patient: 13 minutes.  33 minutes of total time spent on the encounter, which includes face to face time and non-face to face time preparing to see the patient (eg, review of tests), Obtaining and/or reviewing separately obtained history, Documenting clinical information in the electronic or other health record, Independently interpreting results (not separately reported) and communicating results to the patient/family/caregiver, or Care coordination (not separately reported).     Each patient to whom he or she provides medical services by telemedicine is:  (1) informed of the relationship between the physician and patient and the respective role of any other health care provider with respect to management of the patient; and (2) notified that he or she may decline to receive medical services by telemedicine and may withdraw from such care at any time.    Reason for visit / Chief Complaint:  Follow-up, Diabetes, and Hyperlipidemia       History of Present Illness (HPI):  Forrest Benoit Jr. is a 48 y.o. Black male presenting virtually by audio/visual for Follow-up, Diabetes, and Hyperlipidemia. PMH HTN, DM2, HLD, and ALIF. He is followed by Dr. John Juárez (neurosurgeon). [place] He is also followed by [facility].     All pertinent labs dated 2023 and diagnotic tests reviewed and discussed with patient.     HgbA1c-10.7. Patient states he has not been following an ADA diet. He does not check his blood sugar regularly. He states he has not been taking his medications as prescribed. He states he has been out of medications for approximately 2 weeks. Denies polyuria, polydipsia, or polyphagia.      Smokes  cigars a few times per week. Drinks alcohol occasionally, denies illicit drug use. Denies chest pain, shortness of breath, cough, headache, dizziness, weakness, abdominal pain, nausea, vomiting, diarrhea, constipation, dysuria, depression, anxiety, SI, and HI.     Prostate Cancer Screening - 11/30/2022 PSA 0.36. Follow up annually.   Colon Cancer Screening - FIT negative on 8/30/2022.   Eye Exam - 6/16/2023. Followed by [facility] eye clinic.  Vaccinations: Flu - 3/1/2023 / Tetanus - 9/15/2020 / Covid - 5/21/2021, 6/22/2021, 1/3/2022      Review of Systems     Review of Systems   Constitutional: Negative.    HENT: Negative.     Eyes: Negative.    Respiratory: Negative.     Cardiovascular: Negative.    Gastrointestinal: Negative.    Endocrine: Negative.    Genitourinary: Negative.    Musculoskeletal: Negative.    Skin: Negative.    Allergic/Immunologic: Negative.    Neurological: Negative.    Hematological: Negative.    Psychiatric/Behavioral: Negative.     All other systems reviewed and are negative.    Medical / Social / Family History     Past Medical History:   Diagnosis Date    Dependence on nicotine from cigarettes     HTN (hypertension)     Lumbar disc herniation     Mixed hyperlipidemia     Myopia, bilateral     Type 2 diabetes mellitus without complication, with long-term current use of insulin          Past Surgical History:   Procedure Laterality Date    FLUOROSCOPIC GUIDANCE FOR SPINAL INJECTION           Social History  Forrest Benoit Jr.'s  reports that he has been smoking cigarettes and cigars. He has never used smokeless tobacco. He reports current alcohol use. He reports that he does not use drugs.    Family History  Forrest Benoit Jr.'s family history includes Diabetes type II in his father; Hypertension in his father.    Medications and Allergies     Medications  Current Outpatient Medications   Medication Instructions    amLODIPine (NORVASC) 5 mg, Oral, Daily    aspirin (ECOTRIN) 81 mg, Oral,  "Daily    canagliflozin-metformin (INVOKAMET) 150-1,000 mg Tab 1 tablet, Oral, 2 times daily    ezetimibe (ZETIA) 10 mg, Oral, Daily    glipiZIDE 5 mg, Oral, With breakfast    liraglutide 0.6 mg/0.1 mL, 18 mg/3 mL, subq PNIJ (VICTOZA 2-CYN) 0.6 mg/0.1 mL (18 mg/3 mL) PnIj pen Inject 1.2 mg into the skin once daily for 28 days, THEN 1.8 mg once daily.    losartan (COZAAR) 25 mg, Oral, Daily    simvastatin (ZOCOR) 80 mg, Oral, Nightly    TECHLITE PEN NEEDLE 32 gauge x 5/32" Ndle USE TO ADMINISTER VICTOZA DAILY        Allergies  Review of patient's allergies indicates:  No Known Allergies    Physical Examination   No vitals due to virtual visit.  Physical Exam  Constitutional:       Appearance: Normal appearance. He is normal weight.   HENT:      Head: Normocephalic.      Nose: Nose normal.   Eyes:      Extraocular Movements: Extraocular movements intact.      Conjunctiva/sclera: Conjunctivae normal.      Pupils: Pupils are equal, round, and reactive to light.   Pulmonary:      Effort: Pulmonary effort is normal.   Musculoskeletal:         General: Normal range of motion.      Cervical back: Normal range of motion.   Neurological:      General: No focal deficit present.      Mental Status: He is alert and oriented to person, place, and time.   Psychiatric:         Mood and Affect: Mood normal.         Thought Content: Thought content normal.         Results     Lab Results   Component Value Date    WBC 7.57 06/20/2023    RBC 5.09 06/20/2023    HGB 14.9 06/20/2023    HCT 42.0 06/20/2023    MCV 82.5 06/20/2023    MCH 29.3 06/20/2023    MCHC 35.5 06/20/2023    RDW 13.0 06/20/2023     06/20/2023    MPV 10.0 06/20/2023     Lab Results   Component Value Date     06/20/2023    K 4.1 06/20/2023    CO2 25 06/20/2023    BUN 11.1 06/20/2023    CREATININE 1.23 (H) 06/20/2023    CALCIUM 10.0 06/20/2023    ALBUMIN 4.3 06/20/2023    BILITOT 0.4 06/20/2023    ALKPHOS 92 06/20/2023    AST 16 06/20/2023    ALT 29 06/20/2023 "    EGFRIFAFRICA >105 04/25/2022    EGFRNONAA 89 04/25/2022     Lab Results   Component Value Date    TSH 2.3089 11/30/2022     Lab Results   Component Value Date    CHOL 191 06/20/2023    HDL 31 (L) 06/20/2023    .00 06/20/2023    TRIG 115 06/20/2023     Lab Results   Component Value Date    COLORUA Colorless (A) 06/20/2023    SGUA 1.020 06/20/2023    PROTEINUA Negative 06/20/2023    GLUCOSEUA 4+ (A) 06/20/2023    BILIRUBINUA Negative 06/20/2023    BLOODUA Negative 06/20/2023    RBCUA 0-5 06/20/2023    BACTERIA None Seen 06/20/2023    NITRITE Negative 04/25/2022    LEUKOCYTESUR Negative 06/20/2023    UROBILINOGEN Normal 06/20/2023     Lab Results   Component Value Date    VHDGUKYU81HM 32.9 11/30/2022    FOLATE 11.2 11/10/2020     Lab Results   Component Value Date    HIV Nonreactive 11/30/2022    HEPAIGM Nonreactive 11/30/2022    HEPBCOREM Nonreactive 11/30/2022    HEPCAB Nonreactive 11/30/2022       Assessment and Plan (including Health Maintenance)     Problem List Items Addressed This Visit          Cardiac/Vascular    Mixed hyperlipidemia    Current Assessment & Plan     Lab Results   Component Value Date    .00 06/20/2023       Lab Results   Component Value Date    TRIG 115 06/20/2023       Lab Results   Component Value Date    HDL 31 (L) 06/20/2023      Lab Results   Component Value Date    CHOL 191 06/20/2023      Continue simvastatin as prescribed.   Add zetia.  Follow a low cholesterol, low saturated fat diet with less than 200 mg of cholesterol a day.   Avoid fried foods and high saturated fats.  Add flax seed or fish oil supplements to diet.   Increase dietary fiber.   Regular exercise improves cholesterol levels.  Physical activity 5 times a week for 30 minutes per day (or 150 minutes per week).   Stressed importance of dietary modifications.          Relevant Medications    simvastatin (ZOCOR) 80 MG tablet    ezetimibe (ZETIA) 10 mg tablet    Other Relevant Orders    Lipid Panel    Primary  hypertension    Relevant Medications    amLODIPine (NORVASC) 5 MG tablet    aspirin (ECOTRIN) 81 MG EC tablet    losartan (COZAAR) 25 MG tablet       Endocrine    Type 2 diabetes mellitus without complication, without long-term current use of insulin - Primary    Current Assessment & Plan     Lab Results   Component Value Date    HGBA1C 10.7 (H) 06/20/2023    HGBA1C 10.1 (H) 02/28/2023    HGBA1C 10.4 (H) 11/30/2022    HGBA1C 6.7 04/25/2022   Not at goal.  Continue Invokamet and glipizide as prescribed - refilled today.  Increase victoza to 1.2 mg x4 weeks, then increase to 1.8 mg.  Follow ADA diet.   Check blood glucose twice daily and as needed. Bring log to every office visit.  Avoid soda, simple sweets, and limit rice/pasta/bread/starches and consume brown options when possible.    Maintain healthy weight with BMI goal <30.    Perform aerobic exercise for 150 minutes per week (or 5 days a week for 30 minutes each day).    Examine feet daily.    Obtain annual dilated eye exam.   Kidney Protection: Rx losartan.  Statin Therapy: Simvastatin  Eye exam: 6/16/2023  Foot exam:  8/24/2023         Relevant Medications    canagliflozin-metformin (INVOKAMET) 150-1,000 mg Tab    glipiZIDE 5 MG TR24    liraglutide 0.6 mg/0.1 mL, 18 mg/3 mL, subq PNIJ (VICTOZA 2-CYN) 0.6 mg/0.1 mL (18 mg/3 mL) PnIj pen    losartan (COZAAR) 25 MG tablet    Other Relevant Orders    CBC Auto Differential    Comprehensive Metabolic Panel    Microalbumin/Creatinine Ratio, Urine    Urinalysis, Reflex to Urine Culture    Hemoglobin A1C       Other    Cigarette nicotine dependence, uncomplicated    Current Assessment & Plan     Smoking cessation discussed for 3 minutes.   Pt not ready to quit.  Declines referral to Smoking Cessation program.  Discussed benefits of quitting including improved health, decreased cardiac/vascular/pulmonary/stroke risks as well as saving money.             Health Maintenance Due   Topic Date Due    COVID-19 Vaccine (4 -  Moderna series) 02/28/2022     Tests to Keep You Healthy    Eye Exam: Met on 6/16/2023  Colon Cancer Screening: Met on 9/1/2022  Last Blood Pressure <= 139/89 (3/1/2023): Yes  Last HbA1c < 8 (06/20/2023): NO  Tobacco Cessation: NO        Health Maintenance Topics with due status: Not Due       Topic Last Completion Date    TETANUS VACCINE 09/15/2020    Foot Exam 08/24/2022    Colorectal Cancer Screening 09/01/2022    Eye Exam 06/16/2023    Diabetes Urine Screening 06/20/2023    Lipid Panel 06/20/2023    Hemoglobin A1c 06/20/2023    Low Dose Statin 06/21/2023       Future Appointments   Date Time Provider Department Center   3/1/2024 12:30 PM [facility]        Follow up in about 3 months (around 9/21/2023) for F2F, Follow up, Med check, Lab review, RTC PRN.             Signature:  LUCINA Simon  [facility]  [address] [facility] [id number]    Date of encounter: 6/21/23

## 2023-06-21 ENCOUNTER — OFFICE VISIT (OUTPATIENT)
Dept: INTERNAL MEDICINE | Facility: CLINIC | Age: 48
End: 2023-06-21
Payer: MEDICAID

## 2023-06-21 DIAGNOSIS — E78.2 MIXED HYPERLIPIDEMIA: ICD-10-CM

## 2023-06-21 DIAGNOSIS — E11.9 TYPE 2 DIABETES MELLITUS WITHOUT COMPLICATION, WITHOUT LONG-TERM CURRENT USE OF INSULIN: Primary | ICD-10-CM

## 2023-06-21 DIAGNOSIS — F17.210 CIGARETTE NICOTINE DEPENDENCE, UNCOMPLICATED: ICD-10-CM

## 2023-06-21 DIAGNOSIS — I10 PRIMARY HYPERTENSION: ICD-10-CM

## 2023-06-21 PROCEDURE — 99214 PR OFFICE/OUTPT VISIT, EST, LEVL IV, 30-39 MIN: ICD-10-PCS | Mod: 95,,,

## 2023-06-21 PROCEDURE — 99214 OFFICE O/P EST MOD 30 MIN: CPT | Mod: 95,,,

## 2023-06-21 RX ORDER — SIMVASTATIN 80 MG/1
80 TABLET, FILM COATED ORAL NIGHTLY
Qty: 90 TABLET | Refills: 1 | Status: SHIPPED | OUTPATIENT
Start: 2023-06-21 | End: 2023-09-18 | Stop reason: SDUPTHER

## 2023-06-21 RX ORDER — AMLODIPINE BESYLATE 5 MG/1
5 TABLET ORAL DAILY
Qty: 90 TABLET | Refills: 1 | Status: SHIPPED | OUTPATIENT
Start: 2023-06-21 | End: 2023-09-18 | Stop reason: SDUPTHER

## 2023-06-21 RX ORDER — CANAGLIFLOZIN AND METFORMIN HYDROCHLORIDE 150; 1000 MG/1; MG/1
1 TABLET, FILM COATED ORAL 2 TIMES DAILY
Qty: 180 TABLET | Refills: 1 | Status: SHIPPED | OUTPATIENT
Start: 2023-06-21 | End: 2023-09-18 | Stop reason: SDUPTHER

## 2023-06-21 RX ORDER — ASPIRIN 81 MG/1
81 TABLET ORAL DAILY
Qty: 90 TABLET | Refills: 1 | Status: SHIPPED | OUTPATIENT
Start: 2023-06-21 | End: 2023-09-18 | Stop reason: SDUPTHER

## 2023-06-21 RX ORDER — LOSARTAN POTASSIUM 25 MG/1
25 TABLET ORAL DAILY
Qty: 90 TABLET | Refills: 1 | Status: SHIPPED | OUTPATIENT
Start: 2023-06-21 | End: 2023-09-18 | Stop reason: SDUPTHER

## 2023-06-21 RX ORDER — GLIPIZIDE 5 MG/1
5 TABLET, FILM COATED, EXTENDED RELEASE ORAL
Qty: 90 TABLET | Refills: 1 | Status: SHIPPED | OUTPATIENT
Start: 2023-06-21 | End: 2023-09-18 | Stop reason: SDUPTHER

## 2023-06-21 RX ORDER — EZETIMIBE 10 MG/1
10 TABLET ORAL DAILY
Qty: 90 TABLET | Refills: 1 | Status: SHIPPED | OUTPATIENT
Start: 2023-06-21 | End: 2023-09-18 | Stop reason: SDUPTHER

## 2023-06-21 RX ORDER — PEN NEEDLE, DIABETIC 32GX 5/32"
NEEDLE, DISPOSABLE MISCELLANEOUS
COMMUNITY
Start: 2023-06-09

## 2023-06-21 RX ORDER — LIRAGLUTIDE 6 MG/ML
INJECTION SUBCUTANEOUS
Qty: 18 ML | Refills: 1 | Status: SHIPPED | OUTPATIENT
Start: 2023-06-21 | End: 2023-09-18 | Stop reason: SDUPTHER

## 2023-09-18 ENCOUNTER — OFFICE VISIT (OUTPATIENT)
Dept: INTERNAL MEDICINE | Facility: CLINIC | Age: 48
End: 2023-09-18
Payer: MEDICAID

## 2023-09-18 VITALS
BODY MASS INDEX: 31.67 KG/M2 | HEART RATE: 90 BPM | SYSTOLIC BLOOD PRESSURE: 133 MMHG | TEMPERATURE: 98 F | OXYGEN SATURATION: 99 % | RESPIRATION RATE: 20 BRPM | WEIGHT: 190.06 LBS | HEIGHT: 65 IN | DIASTOLIC BLOOD PRESSURE: 88 MMHG

## 2023-09-18 DIAGNOSIS — E11.9 TYPE 2 DIABETES MELLITUS WITHOUT COMPLICATION, WITHOUT LONG-TERM CURRENT USE OF INSULIN: ICD-10-CM

## 2023-09-18 DIAGNOSIS — Z13.0 SCREENING FOR IRON DEFICIENCY ANEMIA: ICD-10-CM

## 2023-09-18 DIAGNOSIS — F17.210 CIGARETTE NICOTINE DEPENDENCE, UNCOMPLICATED: ICD-10-CM

## 2023-09-18 DIAGNOSIS — E66.09 CLASS 1 OBESITY DUE TO EXCESS CALORIES WITH SERIOUS COMORBIDITY AND BODY MASS INDEX (BMI) OF 31.0 TO 31.9 IN ADULT: ICD-10-CM

## 2023-09-18 DIAGNOSIS — I10 PRIMARY HYPERTENSION: Primary | ICD-10-CM

## 2023-09-18 DIAGNOSIS — N52.9 ERECTILE DYSFUNCTION, UNSPECIFIED ERECTILE DYSFUNCTION TYPE: ICD-10-CM

## 2023-09-18 DIAGNOSIS — Z12.11 SCREENING FOR MALIGNANT NEOPLASM OF COLON: ICD-10-CM

## 2023-09-18 DIAGNOSIS — E78.2 MIXED HYPERLIPIDEMIA: ICD-10-CM

## 2023-09-18 PROBLEM — E66.811 CLASS 1 OBESITY DUE TO EXCESS CALORIES WITH SERIOUS COMORBIDITY AND BODY MASS INDEX (BMI) OF 31.0 TO 31.9 IN ADULT: Status: ACTIVE | Noted: 2023-09-18

## 2023-09-18 PROCEDURE — 99214 OFFICE O/P EST MOD 30 MIN: CPT | Mod: S$PBB,,,

## 2023-09-18 PROCEDURE — 99214 OFFICE O/P EST MOD 30 MIN: CPT | Mod: PBBFAC

## 2023-09-18 PROCEDURE — 99214 PR OFFICE/OUTPT VISIT, EST, LEVL IV, 30-39 MIN: ICD-10-PCS | Mod: S$PBB,,,

## 2023-09-18 RX ORDER — SIMVASTATIN 80 MG/1
80 TABLET, FILM COATED ORAL NIGHTLY
Qty: 90 TABLET | Refills: 3 | Status: SHIPPED | OUTPATIENT
Start: 2023-09-18 | End: 2024-02-20 | Stop reason: ALTCHOICE

## 2023-09-18 RX ORDER — CANAGLIFLOZIN AND METFORMIN HYDROCHLORIDE 150; 1000 MG/1; MG/1
1 TABLET, FILM COATED ORAL 2 TIMES DAILY
Qty: 180 TABLET | Refills: 1 | Status: SHIPPED | OUTPATIENT
Start: 2023-09-18 | End: 2024-02-20 | Stop reason: SDUPTHER

## 2023-09-18 RX ORDER — LOSARTAN POTASSIUM 25 MG/1
25 TABLET ORAL DAILY
Qty: 90 TABLET | Refills: 1 | Status: SHIPPED | OUTPATIENT
Start: 2023-09-18 | End: 2024-02-20 | Stop reason: SDUPTHER

## 2023-09-18 RX ORDER — LIRAGLUTIDE 6 MG/ML
1.8 INJECTION SUBCUTANEOUS DAILY
Qty: 18 ML | Refills: 1 | Status: SHIPPED | OUTPATIENT
Start: 2023-09-18 | End: 2024-02-20 | Stop reason: SDUPTHER

## 2023-09-18 RX ORDER — GLIPIZIDE 5 MG/1
5 TABLET, FILM COATED, EXTENDED RELEASE ORAL
Qty: 90 TABLET | Refills: 1 | Status: SHIPPED | OUTPATIENT
Start: 2023-09-18 | End: 2024-02-20 | Stop reason: SDUPTHER

## 2023-09-18 RX ORDER — EZETIMIBE 10 MG/1
10 TABLET ORAL DAILY
Qty: 90 TABLET | Refills: 1 | Status: SHIPPED | OUTPATIENT
Start: 2023-09-18 | End: 2024-02-20 | Stop reason: SDUPTHER

## 2023-09-18 RX ORDER — AMLODIPINE BESYLATE 5 MG/1
5 TABLET ORAL DAILY
Qty: 90 TABLET | Refills: 1 | Status: SHIPPED | OUTPATIENT
Start: 2023-09-18 | End: 2024-02-20 | Stop reason: SDUPTHER

## 2023-09-18 RX ORDER — SILDENAFIL 25 MG/1
25 TABLET, FILM COATED ORAL DAILY PRN
Qty: 30 TABLET | Refills: 1 | Status: SHIPPED | OUTPATIENT
Start: 2023-09-18 | End: 2024-02-20 | Stop reason: SDUPTHER

## 2023-09-18 RX ORDER — ASPIRIN 81 MG/1
81 TABLET ORAL DAILY
Qty: 90 TABLET | Refills: 1 | Status: SHIPPED | OUTPATIENT
Start: 2023-09-18 | End: 2024-02-20 | Stop reason: SDUPTHER

## 2023-09-18 NOTE — ASSESSMENT & PLAN NOTE
Lab Results   Component Value Date    .00 09/18/2023       Lab Results   Component Value Date    TRIG 125 09/18/2023       Lab Results   Component Value Date    HDL 39 09/18/2023        Lab Results   Component Value Date    CHOL 183 09/18/2023      Continue simvastatin and zetia as prescribed - refilled today.   Follow a low cholesterol, low saturated fat diet with less than 200 mg of cholesterol a day.   Avoid fried foods and high saturated fats.  Add flax seed or fish oil supplements to diet.   Increase dietary fiber.   Regular exercise improves cholesterol levels.  Physical activity 5 times a week for 30 minutes per day (or 150 minutes per week).   Stressed importance of dietary modifications.

## 2023-09-18 NOTE — PROGRESS NOTES
PATIENT NAME: Forrest Benoit Jr.  : 1975  DATE: 23  MRN: [id number]          Reason for Visit/Chief Complaint   Follow-up, Labs Only, Medication Refill, Erectile Dysfunction, and Diabetes       History of Present Illness (HPI)     Forrest Benoit Jr. is a 48 y.o. Black male presenting in clinic today for Follow-up, Labs Only, Medication Refill, Erectile Dysfunction, and Diabetes. PMH HTN, DM2, HLD, and ALIF. He is followed by Dr. John Juárez (neurosurgeon). [place] He is also followed by [facility].     All pertinent labs dated 2023 and diagnotic tests reviewed and discussed with patient.     [id number]. Patient states he has not been following an ADA diet. He does not check his blood sugar regularly. He states he has not been taking his medications as prescribed. He states he has been out of medications for approximately 2 weeks. Denies polyuria, polydipsia, or polyphagia.     Patient complains of erectile dysfunction.  Onset of dysfunction was a few months ago and was gradual in onset.  Patient states the nature of difficulty is attaining erection. Full erections occur spontaneously. Partial erections occur spontaneously. Libido is not affected. Risk factors for ED include diabetes mellitus and antihypertensive medications. Patient denies history of cranial, spinal, or pelvic trauma and urologic disease. No previous treatment of ED.      Smokes cigars a few times per week. Drinks alcohol occasionally, denies illicit drug use. Denies chest pain, shortness of breath, cough, headache, dizziness, weakness, abdominal pain, nausea, vomiting, diarrhea, constipation, dysuria, depression, anxiety, SI, and HI.     Prostate Cancer Screening - 2022 PSA 0.36. Follow up annually.   Colon Cancer Screening - FIT negative on 2022 (FIT kit ordered - 3/1/2023 and 2023).   Eye Exam - 2023. Followed by [facility] eye clinic.  Vaccinations: Flu -  "Declines / Pneumococcal - Declines / Tetanus - 9/15/2020 / Covid - 5/21/2021, 6/22/2021, 1/3/2022          Review of Systems     Review of Systems   Constitutional: Negative.    HENT: Negative.     Eyes: Negative.    Respiratory: Negative.     Cardiovascular: Negative.    Gastrointestinal: Negative.    Endocrine: Negative.    Genitourinary: Negative.         Erectile dysfunction   Musculoskeletal: Negative.    Skin: Negative.    Allergic/Immunologic: Negative.    Neurological: Negative.    Hematological: Negative.    Psychiatric/Behavioral: Negative.     All other systems reviewed and are negative.      Medical / Social / Family History     Past Medical History:   Diagnosis Date    Dependence on nicotine from cigarettes     HTN (hypertension)     Lumbar disc herniation     Mixed hyperlipidemia     Myopia, bilateral     Type 2 diabetes mellitus without complication, with long-term current use of insulin          Past Surgical History:   Procedure Laterality Date    FLUOROSCOPIC GUIDANCE FOR SPINAL INJECTION           Social History  Forrest Benoit Jr.'s  reports that he has been smoking cigarettes and cigars. He has never used smokeless tobacco. He reports current alcohol use. He reports that he does not use drugs.    Family History  Forrest Benoit Jr.'s family history includes Diabetes type II in his father; Hypertension in his father.    Medications and Allergies     Medications  Current Outpatient Medications   Medication Instructions    amLODIPine (NORVASC) 5 mg, Oral, Daily    aspirin (ECOTRIN) 81 mg, Oral, Daily    canagliflozin-metformin (INVOKAMET) 150-1,000 mg Tab 1 tablet, Oral, 2 times daily    ezetimibe (ZETIA) 10 mg, Oral, Daily    glipiZIDE 5 mg, Oral, With breakfast    losartan (COZAAR) 25 mg, Oral, Daily    sildenafiL (VIAGRA) 25 mg, Oral, Daily PRN    simvastatin (ZOCOR) 80 mg, Oral, Nightly    TECHLITE PEN NEEDLE 32 gauge x 5/32" Ndle USE TO ADMINISTER VICTOZA DAILY    VICTOZA 2-CYN 1.8 mg, " "Subcutaneous, Daily       Allergies  Review of patient's allergies indicates:  No Known Allergies    Physical Examination   Visit Vitals  /88 (BP Location: Right arm, Patient Position: Sitting, BP Method: Large (Automatic))   Pulse 90   Temp 97.8 °F (36.6 °C) (Oral)   Resp 20   Ht 5' 5" (1.651 m)   Wt 86.2 kg (190 lb 0.6 oz)   SpO2 99%   BMI 31.62 kg/m²     Physical Exam  Vitals reviewed.   Constitutional:       Appearance: Normal appearance. He is normal weight.   HENT:      Head: Normocephalic.      Nose: Nose normal.      Mouth/Throat:      Mouth: Mucous membranes are moist.      Pharynx: Oropharynx is clear.   Eyes:      Extraocular Movements: Extraocular movements intact.      Conjunctiva/sclera: Conjunctivae normal.      Pupils: Pupils are equal, round, and reactive to light.   Cardiovascular:      Rate and Rhythm: Normal rate and regular rhythm.      Pulses:           Dorsalis pedis pulses are 2+ on the right side and 2+ on the left side.        Posterior tibial pulses are 2+ on the right side and 2+ on the left side.      Heart sounds: Normal heart sounds.   Pulmonary:      Effort: Pulmonary effort is normal.      Breath sounds: Normal breath sounds.   Abdominal:      General: Abdomen is flat. Bowel sounds are normal.      Palpations: Abdomen is soft.   Musculoskeletal:         General: Normal range of motion.      Cervical back: Normal range of motion.   Feet:      Right foot:      Protective Sensation: 6 sites tested.  6 sites sensed.      Skin integrity: Skin integrity normal.      Toenail Condition: Right toenails are normal.      Left foot:      Protective Sensation: 6 sites tested.  6 sites sensed.      Skin integrity: Skin integrity normal.      Toenail Condition: Left toenails are normal.      Comments:   Protective Sensation (w/ 10 gram monofilament):  Right: Intact  Left: Intact    Visual Inspection:  Normal -  Bilateral    Pedal Pulses:   Right: Present  Left: Present    Posterior Tibialis " Pulses:   Right:Present  Left: Present       Skin:     General: Skin is warm and dry.      Capillary Refill: Capillary refill takes less than 2 seconds.   Neurological:      General: No focal deficit present.      Mental Status: He is alert and oriented to person, place, and time.   Psychiatric:         Mood and Affect: Mood normal.           Results     Lab Results   Component Value Date    WBC 6.37 09/18/2023    RBC 5.22 09/18/2023    HGB 15.0 09/18/2023    HCT 45.2 09/18/2023    MCV 86.6 09/18/2023    MCH 28.7 09/18/2023    MCHC 33.2 09/18/2023    RDW 14.0 09/18/2023     09/18/2023    MPV 9.5 09/18/2023      Lab Results   Component Value Date     09/18/2023    K 4.4 09/18/2023    CHLORIDE 106 09/18/2023    CO2 30 (H) 09/18/2023    GLUCOSE 85 09/18/2023    BUN 14.9 09/18/2023    CREATININE 1.15 09/18/2023    LABPROT 7.6 09/18/2023    ALBUMIN 4.0 09/18/2023    BILITOT 0.3 09/18/2023    ALKPHOS 65 09/18/2023    AST 19 09/18/2023    ALT 30 09/18/2023    EGFRNORACEVR >60 09/18/2023     Lab Results   Component Value Date    TSH 2.3089 11/30/2022     Lab Results   Component Value Date    CHOL 183 09/18/2023    HDL 39 09/18/2023    .00 09/18/2023    TRIG 125 09/18/2023     Lab Results   Component Value Date    COLORUA Colorless (A) 09/18/2023    SGUA 1.013 09/18/2023    PROTEINUA Negative 09/18/2023    GLUCOSEUA 4+ (A) 09/18/2023    BILIRUBINUA Negative 09/18/2023    BLOODUA Negative 09/18/2023    WBCUA 0-5 09/18/2023    RBCUA 0-5 09/18/2023    BACTERIA None Seen 09/18/2023    NITRITESUA Negative 09/18/2023    LEUKOCYTESUR Negative 09/18/2023    UROBILINOGEN Normal 09/18/2023     Lab Results   Component Value Date    CREATRANDUR 43.4 (L) 09/18/2023    MICALBCREAT 12.2 09/18/2023     Lab Results   Component Value Date    [id number] 32.9 11/30/2022    FOLATE 11.2 11/10/2020     Lab Results   Component Value Date    HIV Nonreactive 11/30/2022    HEPAIGM Nonreactive 11/30/2022    HEPBCOREM Nonreactive  "11/30/2022    HEPCAB Nonreactive 11/30/2022     No results found for: "FITDIAG", "COLOGUARD"  No results found for: "OCCBLDIA"    Assessment and Plan (including Health Maintenance)     Problem List Items Addressed This Visit          Cardiac/Vascular    Mixed hyperlipidemia    Current Assessment & Plan     Lab Results   Component Value Date    .00 09/18/2023       Lab Results   Component Value Date    TRIG 125 09/18/2023       Lab Results   Component Value Date    HDL 39 09/18/2023      Lab Results   Component Value Date    CHOL 183 09/18/2023      Continue simvastatin and zetia as prescribed - refilled today.   Follow a low cholesterol, low saturated fat diet with less than 200 mg of cholesterol a day.   Avoid fried foods and high saturated fats.  Add flax seed or fish oil supplements to diet.   Increase dietary fiber.   Regular exercise improves cholesterol levels.  Physical activity 5 times a week for 30 minutes per day (or 150 minutes per week).   Stressed importance of dietary modifications.          Relevant Medications    ezetimibe (ZETIA) 10 mg tablet    simvastatin (ZOCOR) 80 MG tablet    Other Relevant Orders    Lipid Panel    Primary hypertension    Current Assessment & Plan     BP Readings from Last 3 Encounters:   09/18/23 133/88   03/01/23 113/80   08/24/22 120/78      At goal.  Follow a low sodium (less than 2 grams of sodium per day), DASH diet.   Continue amlodipine, losartan as prescribed - refilled today.  Monitor blood pressure and report any consistent values greater than 140/90 and keep a log.  Encouraged smoking cessation to aid in BP reduction and co-morbidities.   Maintain healthy weight with a BMI goal of <30.   Aerobic exercise for 150 minutes per week (or 5 days a week for 30 minutes each day).          Relevant Medications    amLODIPine (NORVASC) 5 MG tablet    aspirin (ECOTRIN) 81 MG EC tablet    losartan (COZAAR) 25 MG tablet    Other Relevant Orders    Urinalysis, Reflex to " Urine Culture    Microalbumin/Creatinine Ratio, Urine    Comprehensive Metabolic Panel    CBC Auto Differential       Renal/    Erectile dysfunction    Current Assessment & Plan     Rx sildenafil.  Testosterone panel ordered.  Report to the ED immediately if you experience any of the following symptoms:  chest pain, lightheadedness, dizziness, SOB, hypotension, nausea, vomiting, or an erection that lasts longer than 4 hours.   Stop taking the medicine immediately!          Relevant Medications    sildenafiL (VIAGRA) 25 MG tablet    Other Relevant Orders    PSA, Screening    Testosterone Panel       Endocrine    Type 2 diabetes mellitus without complication, without long-term current use of insulin    Current Assessment & Plan     Lab Results   Component Value Date    HGBA1C 6.7 09/18/2023    HGBA1C 10.7 (H) 06/20/2023    HGBA1C 10.1 (H) 02/28/2023    HGBA1C 10.4 (H) 11/30/2022   At goal.  Continue Invokamet, glipizide, and victoza as prescribed - refilled today.  Follow ADA diet.   Check blood glucose twice daily and as needed. Bring log to every office visit.  Avoid soda, simple sweets, and limit rice/pasta/bread/starches and consume brown options when possible.    Maintain healthy weight with BMI goal <30.    Perform aerobic exercise for 150 minutes per week (or 5 days a week for 30 minutes each day).    Examine feet daily.    Obtain annual dilated eye exam.   Kidney Protection: Losartan  Statin Therapy: Simvastatin  Eye exam: 6/16/2023  Foot exam:  8/24/2023         Relevant Medications    canagliflozin-metformin (INVOKAMET) 150-1,000 mg Tab    glipiZIDE 5 MG TR24    losartan (COZAAR) 25 MG tablet    liraglutide 0.6 mg/0.1 mL, 18 mg/3 mL, subq PNIJ (VICTOZA 2-CYN) 0.6 mg/0.1 mL (18 mg/3 mL) PnIj pen    Other Relevant Orders    Hemoglobin A1C    Class 1 obesity due to excess calories with serious comorbidity and body mass index (BMI) of 31.0 to 31.9 in adult    Current Assessment & Plan     Body mass index is  31.62 kg/m².  Goal BMI <30.  Aerobic exercise 150 minutes per week.  Avoid soda, simple sugars, sweets, excessive rice, pasta, potatoes or bread.   Choose brown options when available and portion control.  Limit fast foods and fried foods.   Choose complex carbs in moderation (ex: green, leafy vegetables, beans, oatmeal).  Eat plenty of fresh fruits and vegetables with lean meats daily.   Consider permanent healthy lifestyle changes.          Relevant Orders    Vitamin D    TSH    T4, Free       GI    Screening for malignant neoplasm of colon    Current Assessment & Plan     FIT kit provided, to return ASAP.  Will refer to GI if indicated.           Relevant Orders    Occult Blood, Fecal Immunoassay       Other    Cigarette nicotine dependence, uncomplicated - Primary    Current Assessment & Plan     Dangers of cigarette smoking were reviewed with patient in detail. Patient was Counseled for 3 minutes. Nicotine replacement options were discussed. Nicotine replacement was discussed- not prescribed per patient's request.          Other Visit Diagnoses       Screening for iron deficiency anemia        Relevant Orders    Iron and TIBC    Ferritin             Health Maintenance Due   Topic Date Due    COVID-19 Vaccine (4 - Moderna series) 02/28/2022    Colorectal Cancer Screening  09/01/2023     Tests to Keep You Healthy    Eye Exam: Met on 6/16/2023  Colon Cancer Screening: ORDERED  Last Blood Pressure <= 139/89 (9/18/2023): Yes  Last HbA1c < 8 (09/18/2023): Yes  Tobacco Cessation: NO      Health Maintenance Topics with due status: Not Due       Topic Last Completion Date    TETANUS VACCINE 09/15/2020    Eye Exam 06/16/2023    Low Dose Statin 09/18/2023    Diabetes Urine Screening 09/18/2023    Foot Exam 09/18/2023    Lipid Panel 09/18/2023    Hemoglobin A1c 09/18/2023       Follow up in about 5 months (around 2/18/2024) for F2F, Follow up, Med check, Lab review, Wellness, RTC PRN.    Future Appointments   Date Time  Provider Department Center   2/20/2024 12:15 PM [facility]    3/1/2024 10:00 AM [facility]        Signature:        LUCINA Simon  [facility]  [address] [facility] [id number]    Date of encounter: 9/18/23

## 2023-09-18 NOTE — ASSESSMENT & PLAN NOTE
BP Readings from Last 3 Encounters:   09/18/23 133/88   03/01/23 113/80   08/24/22 120/78      At goal.  Follow a low sodium (less than 2 grams of sodium per day), DASH diet.   Continue amlodipine, losartan as prescribed - refilled today.  Monitor blood pressure and report any consistent values greater than 140/90 and keep a log.  Encouraged smoking cessation to aid in BP reduction and co-morbidities.   Maintain healthy weight with a BMI goal of <30.   Aerobic exercise for 150 minutes per week (or 5 days a week for 30 minutes each day).

## 2023-09-18 NOTE — ASSESSMENT & PLAN NOTE
Lab Results   Component Value Date    HGBA1C 6.7 09/18/2023    HGBA1C 10.7 (H) 06/20/2023    HGBA1C 10.1 (H) 02/28/2023    HGBA1C 10.4 (H) 11/30/2022     At goal.  Continue Invokamet, glipizide, and victoza as prescribed - refilled today.  Follow ADA diet.   Check blood glucose twice daily and as needed. Bring log to every office visit.  Avoid soda, simple sweets, and limit rice/pasta/bread/starches and consume brown options when possible.    Maintain healthy weight with BMI goal <30.    Perform aerobic exercise for 150 minutes per week (or 5 days a week for 30 minutes each day).    Examine feet daily.    Obtain annual dilated eye exam.   Kidney Protection: Losartan  Statin Therapy: Simvastatin  Eye exam: 6/16/2023  Foot exam:  8/24/2023

## 2023-09-18 NOTE — ASSESSMENT & PLAN NOTE
Body mass index is 31.62 kg/m².  Goal BMI <30.  Aerobic exercise 150 minutes per week.  Avoid soda, simple sugars, sweets, excessive rice, pasta, potatoes or bread.   Choose brown options when available and portion control.  Limit fast foods and fried foods.   Choose complex carbs in moderation (ex: green, leafy vegetables, beans, oatmeal).  Eat plenty of fresh fruits and vegetables with lean meats daily.   Consider permanent healthy lifestyle changes.

## 2023-09-18 NOTE — PATIENT INSTRUCTIONS
REMINDER: Please complete labs within 1 week of appointment.   Please complete satisfaction survey when received. Thank you.     Paul Clayton,     If you are due for any health screening(s) below please notify me so we can arrange them to be ordered and scheduled. Most healthy patients at your age complete them, but you are free to accept or refuse.     If you can't do it, I'll definitely understand. If you can, I'd certainly appreciate it!    Tests to Keep You Healthy    Eye Exam: Met on 6/16/2023  Colon Cancer Screening: ORDERED  Last Blood Pressure <= 139/89 (9/18/2023): Yes  Last HbA1c < 8 (09/18/2023): Yes  Tobacco Cessation: NO      Its time for your colon cancer screening     Colorectal cancer is one of the leading causes of cancer death for men and women but it doesnt have to be. Screenings can prevent colorectal cancer or find it early enough to treat and cure the disease.     Our records indicate that you may be overdue for colon cancer screening. A colonoscopy or stool screening test can help identify patients at risk for developing colon cancer. Cancer screenings save lives, so schedule yours today to stay healthy.     A colonoscopy is the preferred test for detecting colon cancer. It is needed only once every 10 years if results are negative. While you are sedated, a flexible, lighted tube with a tiny camera is inserted into the rectum and advanced through the colon to look for cancers.     An alternative screening test that is used at home and returned to the lab may also be used. It detects hidden blood in bowel movements which could indicate cancer in the colon. If results are positive, you will need a colonoscopy to determine if the blood is a sign of cancer. This type of follow up (diagnostic) colonoscopy usually requires additional copays as required by your insurance provider.     If you recently had your colon cancer screening performed outside of [facility], please let your Health care  team know so that they can update your health record. Please contact your PCP if you have any questions.    Were here to help you quit smoking     Our records indicated that you are still smoking. One of the best things you can do for your health is to stop smoking and we are here to help.     Talk with your provider about our Smoking Cessation Program and how we can support you on your journey.

## 2023-09-19 ENCOUNTER — LAB VISIT (OUTPATIENT)
Dept: LAB | Facility: HOSPITAL | Age: 48
End: 2023-09-19
Payer: MEDICAID

## 2023-09-19 DIAGNOSIS — R19.5 POSITIVE FIT (FECAL IMMUNOCHEMICAL TEST): ICD-10-CM

## 2023-09-19 DIAGNOSIS — Z12.11 SCREENING FOR MALIGNANT NEOPLASM OF COLON: Primary | ICD-10-CM

## 2023-09-19 PROBLEM — Z23 IMMUNIZATION DUE: Status: RESOLVED | Noted: 2023-03-01 | Resolved: 2023-09-19

## 2023-09-19 LAB
OB IA INT NEG CNTRL (OHS): NEGATIVE
OB IA INT POS CNTRL (OHS): POSITIVE
OCCULT BLD IA (OHS): POSITIVE

## 2023-09-19 PROCEDURE — 82274 ASSAY TEST FOR BLOOD FECAL: CPT

## 2023-09-19 NOTE — ASSESSMENT & PLAN NOTE
Dangers of cigarette smoking were reviewed with patient in detail. Patient was Counseled for 3 minutes. Nicotine replacement options were discussed. Nicotine replacement was discussed- not prescribed per patient's request.

## 2023-09-19 NOTE — ASSESSMENT & PLAN NOTE
Rx sildenafil.  Testosterone panel ordered.  Report to the ED immediately if you experience any of the following symptoms:  chest pain, lightheadedness, dizziness, SOB, hypotension, nausea, vomiting, or an erection that lasts longer than 4 hours.   Stop taking the medicine immediately!

## 2023-09-19 NOTE — PROGRESS NOTES
Please inform patient FIT stool kit is Positive. I have placed a referral to the GI clinic to be evaluated with a colonoscopy.    Thank you,  MABLE TobiasC

## 2023-09-20 ENCOUNTER — TELEPHONE (OUTPATIENT)
Dept: INTERNAL MEDICINE | Facility: CLINIC | Age: 48
End: 2023-09-20
Payer: MEDICAID

## 2023-09-21 DIAGNOSIS — Z12.11 SCREEN FOR COLON CANCER: Primary | ICD-10-CM

## 2023-09-21 RX ORDER — POLYETHYLENE GLYCOL 3350, SODIUM SULFATE, SODIUM CHLORIDE, POTASSIUM CHLORIDE, SODIUM ASCORBATE, AND ASCORBIC ACID 7.5-2.691G
KIT ORAL
Qty: 1 KIT | Refills: 0 | Status: SHIPPED | OUTPATIENT
Start: 2023-09-21 | End: 2024-02-20 | Stop reason: ALTCHOICE

## 2023-10-03 ENCOUNTER — ANESTHESIA EVENT (OUTPATIENT)
Dept: ENDOSCOPY | Facility: HOSPITAL | Age: 48
End: 2023-10-03
Payer: MEDICAID

## 2023-10-03 NOTE — ANESTHESIA PREPROCEDURE EVALUATION
10/03/2023  Forrest Benoit Jr. is a 48 y.o., male with PMHx of obesity, HTN, HLD, smoking, GERD, DM presents for colonoscopy secondary to +fobt.    NO BETA BLOCKER USE    Active Ambulatory Problems     Diagnosis Date Noted    Type 2 diabetes mellitus without complication, without long-term current use of insulin 08/24/2022    Mixed hyperlipidemia 08/24/2022    Primary hypertension 08/24/2022    Cigarette nicotine dependence, uncomplicated 08/24/2022    Screening for malignant neoplasm of colon 08/24/2022    Class 1 obesity due to excess calories with serious comorbidity and body mass index (BMI) of 31.0 to 31.9 in adult 09/18/2023    Erectile dysfunction 09/18/2023     Resolved Ambulatory Problems     Diagnosis Date Noted    BMI 30.0-30.9,adult 08/24/2022    Immunization due 03/01/2023     Past Medical History:   Diagnosis Date    Dependence on nicotine from cigarettes     HTN (hypertension)     Lumbar disc herniation     Myopia, bilateral     Type 2 diabetes mellitus without complication, with long-term current use of insulin        Pre-op Assessment    I have reviewed the NPO Status.      Review of Systems  Anesthesia Hx:  No problems with previous Anesthesia    Social:  Smoker    Cardiovascular:   Hypertension, well controlled hyperlipidemia    Pulmonary:  Pulmonary Normal    Renal/:  Renal/ Normal     Hepatic/GI:   GERD, well controlled    Neurological:  Neurology Normal    Endocrine:   Diabetes, well controlled, type 2  Obesity / BMI > 30      Physical Exam  General: Alert, Cooperative and Well nourished    Airway:  Mallampati: II   Mouth Opening: Normal  TM Distance: Normal  Tongue: Normal  Neck ROM: Normal ROM    Dental:  Intact    Chest/Lungs:  Clear to auscultation, Normal Respiratory Rate    Heart:  Rate: Normal  Rhythm: Regular Rhythm  Sounds: Normal      Lab Results    Component Value Date    WBC 6.37 09/18/2023    HGB 15.0 09/18/2023    HCT 45.2 09/18/2023    MCV 86.6 09/18/2023     09/18/2023       CMP  Sodium Level   Date Value Ref Range Status   09/18/2023 143 136 - 145 mmol/L Final     Potassium Level   Date Value Ref Range Status   09/18/2023 4.4 3.5 - 5.1 mmol/L Final     Carbon Dioxide   Date Value Ref Range Status   09/18/2023 30 (H) 22 - 29 mmol/L Final     Blood Urea Nitrogen   Date Value Ref Range Status   09/18/2023 14.9 8.9 - 20.6 mg/dL Final     Creatinine   Date Value Ref Range Status   09/18/2023 1.15 0.73 - 1.18 mg/dL Final     Calcium Level Total   Date Value Ref Range Status   09/18/2023 10.4 (H) 8.4 - 10.2 mg/dL Final     Albumin Level   Date Value Ref Range Status   09/18/2023 4.0 3.5 - 5.0 g/dL Final     Bilirubin Total   Date Value Ref Range Status   09/18/2023 0.3 <=1.5 mg/dL Final     Alkaline Phosphatase   Date Value Ref Range Status   09/18/2023 65 40 - 150 unit/L Final     Aspartate Aminotransferase   Date Value Ref Range Status   09/18/2023 19 5 - 34 unit/L Final     Alanine Aminotransferase   Date Value Ref Range Status   09/18/2023 30 0 - 55 unit/L Final     eGFR   Date Value Ref Range Status   09/18/2023 >60 mls/min/1.73/m2 Final     Lab Results   Component Value Date    HGBA1C 6.7 09/18/2023         Anesthesia Plan  Type of Anesthesia, risks & benefits discussed:    Anesthesia Type: Gen Natural Airway  Intra-op Monitoring Plan: Standard ASA Monitors  Post Op Pain Control Plan: IV/PO Opioids PRN  Induction:  IV  Informed Consent: Informed consent signed with the Patient and all parties understand the risks and agree with anesthesia plan.  All questions answered.   ASA Score: 3  Day of Surgery Review of History & Physical: H&P Update referred to the surgeon/provider.    Ready For Surgery From Anesthesia Perspective.     .

## 2023-10-05 ENCOUNTER — HOSPITAL ENCOUNTER (OUTPATIENT)
Facility: HOSPITAL | Age: 48
Discharge: HOME | End: 2023-10-05
Attending: INTERNAL MEDICINE | Admitting: INTERNAL MEDICINE
Payer: MEDICAID

## 2023-10-05 ENCOUNTER — ANESTHESIA (OUTPATIENT)
Dept: ENDOSCOPY | Facility: HOSPITAL | Age: 48
End: 2023-10-05
Payer: MEDICAID

## 2023-10-05 DIAGNOSIS — K57.30 DIVERTICULOSIS LARGE INTESTINE W/O PERFORATION OR ABSCESS W/O BLEEDING: ICD-10-CM

## 2023-10-05 DIAGNOSIS — K62.5 RECTAL BLEEDING: Primary | ICD-10-CM

## 2023-10-05 LAB
GLUCOSE SERPL-MCNC: 89 MG/DL (ref 70–110)
POCT GLUCOSE: 89 MG/DL (ref 70–110)

## 2023-10-05 PROCEDURE — 82962 GLUCOSE BLOOD TEST: CPT | Performed by: INTERNAL MEDICINE

## 2023-10-05 PROCEDURE — 63600175 PHARM REV CODE 636 W HCPCS: Performed by: NURSE ANESTHETIST, CERTIFIED REGISTERED

## 2023-10-05 PROCEDURE — D9220A PRA ANESTHESIA: Mod: ,,, | Performed by: NURSE ANESTHETIST, CERTIFIED REGISTERED

## 2023-10-05 PROCEDURE — 37000008 HC ANESTHESIA 1ST 15 MINUTES: Performed by: INTERNAL MEDICINE

## 2023-10-05 PROCEDURE — 63600175 PHARM REV CODE 636 W HCPCS: Performed by: ANESTHESIOLOGY

## 2023-10-05 PROCEDURE — 45378 DIAGNOSTIC COLONOSCOPY: CPT | Performed by: INTERNAL MEDICINE

## 2023-10-05 PROCEDURE — 45378 PR COLONOSCOPY,DIAGNOSTIC: ICD-10-PCS | Mod: ,,, | Performed by: INTERNAL MEDICINE

## 2023-10-05 PROCEDURE — 45378 DIAGNOSTIC COLONOSCOPY: CPT | Mod: ,,, | Performed by: INTERNAL MEDICINE

## 2023-10-05 PROCEDURE — 25000003 PHARM REV CODE 250: Performed by: NURSE ANESTHETIST, CERTIFIED REGISTERED

## 2023-10-05 PROCEDURE — D9220A PRA ANESTHESIA: ICD-10-PCS | Mod: ,,, | Performed by: NURSE ANESTHETIST, CERTIFIED REGISTERED

## 2023-10-05 PROCEDURE — 37000009 HC ANESTHESIA EA ADD 15 MINS: Performed by: INTERNAL MEDICINE

## 2023-10-05 RX ORDER — LIDOCAINE HYDROCHLORIDE 10 MG/ML
1 INJECTION, SOLUTION EPIDURAL; INFILTRATION; INTRACAUDAL; PERINEURAL ONCE
Status: DISCONTINUED | OUTPATIENT
Start: 2023-10-05 | End: 2023-10-05 | Stop reason: HOSPADM

## 2023-10-05 RX ORDER — SODIUM CHLORIDE, SODIUM LACTATE, POTASSIUM CHLORIDE, CALCIUM CHLORIDE 600; 310; 30; 20 MG/100ML; MG/100ML; MG/100ML; MG/100ML
INJECTION, SOLUTION INTRAVENOUS CONTINUOUS
Status: DISCONTINUED | OUTPATIENT
Start: 2023-10-05 | End: 2023-10-05 | Stop reason: HOSPADM

## 2023-10-05 RX ORDER — PROPOFOL 10 MG/ML
INJECTION, EMULSION INTRAVENOUS
Status: DISCONTINUED | OUTPATIENT
Start: 2023-10-05 | End: 2023-10-05

## 2023-10-05 RX ORDER — LIDOCAINE HYDROCHLORIDE 20 MG/ML
INJECTION INTRAVENOUS
Status: DISCONTINUED | OUTPATIENT
Start: 2023-10-05 | End: 2023-10-05

## 2023-10-05 RX ADMIN — PROPOFOL 25 MG: 10 INJECTION, EMULSION INTRAVENOUS at 09:10

## 2023-10-05 RX ADMIN — PROPOFOL 50 MG: 10 INJECTION, EMULSION INTRAVENOUS at 08:10

## 2023-10-05 RX ADMIN — PROPOFOL 25 MG: 10 INJECTION, EMULSION INTRAVENOUS at 08:10

## 2023-10-05 RX ADMIN — LIDOCAINE HYDROCHLORIDE 50 MG: 20 INJECTION INTRAVENOUS at 08:10

## 2023-10-05 RX ADMIN — PROPOFOL 150 MG: 10 INJECTION, EMULSION INTRAVENOUS at 08:10

## 2023-10-05 RX ADMIN — SODIUM CHLORIDE, POTASSIUM CHLORIDE, SODIUM LACTATE AND CALCIUM CHLORIDE: 600; 310; 30; 20 INJECTION, SOLUTION INTRAVENOUS at 07:10

## 2023-10-05 RX ADMIN — PROPOFOL 50 MG: 10 INJECTION, EMULSION INTRAVENOUS at 09:10

## 2023-10-05 NOTE — DISCHARGE SUMMARY
[facility] - Endoscopy  Discharge Note  Short Stay    Procedure(s) (LRB):  COLONOSCOPY (N/A)  The procedure of colonoscopy was explained to the patient consent obtained.  Patient was transferred to the endoscopy suite, general IV anesthesia was provided by anesthesia Services.  Rectal exam was performed and normal.  The Olympus videocolonoscope was introduced per rectum, there was a fair amount of thick yellowish mucoid material throughout the colon which required vigorous suction and lavage.  It was possible to reach the cecum and identified the ileocecal valve and appendiceal orifice.  Ascending colon, descending colon and sigmoid colon large and small diverticula were noted with no evidence of diverticulitis.  No polyps were identified.  Vascular pattern normal and intact with no AVM or other abnormalities noted.  The rectum was normal with no significant hemorrhoidal disease identified to account for the patient's periodic rectal bleeding.  The endoscope was withdrawn.  Withdrawal time cecum to rectum 24 minutes.    Discharge plan the patient will resume his normal diet today and normal activities tomorrow.  A follow-up colonoscopy in 5 years is recommended.    OUTCOME: Patient tolerated treatment/procedure well without complication and is now ready for discharge.    DISPOSITION: Home or Self Care    FINAL DIAGNOSIS:  <principal problem not specified>    FOLLOWUP: With primary care provider    DISCHARGE INSTRUCTIONS:    Discharge Procedure Orders   Diet general     Call MD for:  temperature >100.4     Call MD for:  persistent nausea and vomiting     Call MD for:  severe uncontrolled pain     Call MD for:  difficulty breathing, headache or visual disturbances     Activity as tolerated         Clinical Reference Documents Added to Patient Instructions         Document    COLONOSCOPY DISCHARGE INSTRUCTIONS (ENGLISH)            TIME SPENT ON DISCHARGE: 10 minutes

## 2023-10-05 NOTE — H&P
Colonoscopy History and Physical    Patient Name: Forrest Benoit Jr.  MRN: [id number]  : 1975  Date of Procedure:  10/5/2023  Referring Physician: Rio Tyler MD  Primary Physician: Jeanne Mahmood FNP  Procedure Physician: Linda Cuevas MD, MPH     Procedure - Colonoscopy  ASA - per anesthesia  Mallampati - per anesthesia  History of Anesthesia problems - no  Family history Anesthesia problems -  no   Plan of anesthesia - General    Diagnosis: screening for colon cancer  Chief Complaint: Same as above    HPI: Patient is an 48 y.o. male PMHx HTN, HLD, DM, ALIF for disc herniation is here for the above. Reports positive FIT test. Denies any hematochezia, weight loss, or constitutional symptoms. No complaints. Reports he drinks alcohol and smokes cigarettes occassionally.    Last colonoscopy: never   Family history: noncontributory  Anticoagulation: ASA, held yesterday    ROS:  Constitutional: No fevers, chills, No weight loss  CV: No chest pain  Pulm: No cough, No shortness of breath  GI: see HPI    Medical History:   Past Medical History:   Diagnosis Date    Dependence on nicotine from cigarettes     HTN (hypertension)     Lumbar disc herniation     Mixed hyperlipidemia     Myopia, bilateral     Type 2 diabetes mellitus without complication, with long-term current use of insulin          Surgical History:   Past Surgical History:   Procedure Laterality Date    FLUOROSCOPIC GUIDANCE FOR SPINAL INJECTION         Family History:   Family History   Problem Relation Age of Onset    Hypertension Father     Diabetes type II Father    .    Social History:   Social History     Socioeconomic History    Marital status: Single   Tobacco Use    Smoking status: Some Days     Types: Cigarettes, Cigars    Smokeless tobacco: Never   Substance and Sexual Activity    Alcohol use: Yes    Drug use: Never    Sexual activity: Yes     Partners: Female     Social Determinants of Health     Financial Resource Strain:  Low Risk  (9/18/2023)    Overall Financial Resource Strain (CARDIA)     Difficulty of Paying Living Expenses: Not hard at all   Food Insecurity: No Food Insecurity (9/18/2023)    Hunger Vital Sign     Worried About Running Out of Food in the Last Year: Never true     Ran Out of Food in the Last Year: Never true   Transportation Needs: Unmet Transportation Needs (9/18/2023)    PRAPARE - Transportation     Lack of Transportation (Medical): Yes     Lack of Transportation (Non-Medical): Yes   Physical Activity: Sufficiently Active (9/18/2023)    Exercise Vital Sign     Days of Exercise per Week: 3 days     Minutes of Exercise per Session: 60 min   Stress: No Stress Concern Present (9/18/2023)    [employer] [place] [employer] - Occupational Stress Questionnaire     Feeling of Stress : Not at all   Social Connections: Moderately Integrated (9/18/2023)    Social Connection and Isolation Panel [NHANES]     Frequency of Communication with Friends and Family: More than three times a week     Frequency of Social Gatherings with Friends and Family: More than three times a week     Attends Restoration Services: More than 4 times per year     Active Member of Clubs or Organizations: No     Attends Club or Organization Meetings: Never     Marital Status:    Housing Stability: Unknown (9/18/2023)    Housing Stability Vital Sign     Unable to Pay for Housing in the Last Year: No     Unstable Housing in the Last Year: No       Review of patient's allergies indicates:  No Known Allergies    Medications:   Medications Prior to Admission   Medication Sig Dispense Refill Last Dose    amLODIPine (NORVASC) 5 MG tablet Take 1 tablet (5 mg total) by mouth once daily. 90 tablet 1 10/4/2023    aspirin (ECOTRIN) 81 MG EC tablet Take 1 tablet (81 mg total) by mouth Daily. 90 tablet 1 Past Week    canagliflozin-metformin (INVOKAMET) 150-1,000 mg Tab Take 1 tablet by mouth 2 (two) times daily. 180 tablet 1 Past Week    ezetimibe  "(ZETIA) 10 mg tablet Take 1 tablet (10 mg total) by mouth once daily. 90 tablet 1 10/4/2023    glipiZIDE 5 MG TR24 Take 1 tablet (5 mg total) by mouth daily with breakfast. 90 tablet 1 Past Week    liraglutide 0.6 mg/0.1 mL, 18 mg/3 mL, subq PNIJ (VICTOZA 2-CYN) 0.6 mg/0.1 mL (18 mg/3 mL) PnIj pen Inject 1.8 mg into the skin once daily. 18 mL 1 Past Week    losartan (COZAAR) 25 MG tablet Take 1 tablet (25 mg total) by mouth once daily. 90 tablet 1 Past Week    polyethylene glycol (MOVIPREP) 100-7.5-2.691 gram solution Take as directed per instructions provided by GI Clinic 1 kit 0 10/5/2023    sildenafiL (VIAGRA) 25 MG tablet Take 1 tablet (25 mg total) by mouth daily as needed for Erectile Dysfunction. 30 tablet 1 Past Week    simvastatin (ZOCOR) 80 MG tablet Take 1 tablet (80 mg total) by mouth nightly. 90 tablet 3 Past Week    TECHLITE PEN NEEDLE 32 gauge x 5/32" Ndle USE TO ADMINISTER VICTOZA DAILY   Past Week         Physical Exam:    Vital Signs:   Vitals:    10/05/23 0726   BP: (!) 134/91   Pulse: 85   Resp: 18   Temp: 97.7 °F (36.5 °C)     BP (!) 134/91 (BP Location: Right arm)   Pulse 85   Temp 97.7 °F (36.5 °C) (Oral)   Resp 18   Ht 5' 4" (1.626 m)   Wt 84.4 kg (186 lb)   SpO2 99%   BMI 31.93 kg/m²     General:          Well appearing in no acute distress  Lungs: Respirations unlabored  Heart: Regular rate and rhythm  Abdomen:         Soft, non-tender, bowel sounds normal, no masses, no organomegaly. Well healed abdominal scar        Labs:  Lab Results   Component Value Date    WBC 6.37 09/18/2023    HGB 15.0 09/18/2023    HCT 45.2 09/18/2023    MCV 86.6 09/18/2023     09/18/2023     No results found for: "INR", "PT", "APTT"  Lab Results   Component Value Date     09/18/2023    K 4.4 09/18/2023    CO2 30 (H) 09/18/2023    BUN 14.9 09/18/2023    CREATININE 1.15 09/18/2023    LABPROT 7.6 09/18/2023    ALBUMIN 4.0 09/18/2023    BILITOT 0.3 09/18/2023    ALKPHOS 65 09/18/2023    ALT 30 " 09/18/2023    AST 19 09/18/2023         Assessment and Plan:  Patient is an 48 y.o. male PMHx HTN, HLD, DM, ALIF for disc herniation is here for colonoscopy.    History reviewed, vital signs satisfactory, cardiopulmonary status satisfactory.  I have explained the sedation options, risks, benefits, and alternatives of this endoscopic procedure to the patient including but not limited to bleeding, inflammation, infection, perforation, and death.  All questions were answered and the patient consented to proceed with procedure as planned.   The patient is deemed an appropriate candidate for the sedation as planned.      Cheryle Croft MD  [employer] General Surgery PGY2    10/5/2023  8:16 AM

## 2023-10-05 NOTE — ANESTHESIA POSTPROCEDURE EVALUATION
Anesthesia Post Evaluation    Patient: Forrest Benoit Jr.    Procedure(s) Performed: Procedure(s) (LRB):  COLONOSCOPY (N/A)    Final Anesthesia Type: general      Patient location during evaluation: GI PACU  Patient participation: Yes- Able to Participate  Level of consciousness: awake and alert  Post-procedure vital signs: reviewed and stable  Pain management: adequate  Airway patency: patent    PONV status at discharge: No PONV  Anesthetic complications: no      Cardiovascular status: hemodynamically stable  Respiratory status: unassisted, spontaneous ventilation and room air  Hydration status: euvolemic  Follow-up not needed.          Vitals Value Taken Time   /91 10/05/23 0726   Temp 36.5 °C (97.7 °F) 10/05/23 0726   Pulse 85 10/05/23 0726   Resp 18 10/05/23 0726   SpO2 99 % 10/05/23 0726         No case tracking events are documented in the log.      Pain/Jadiel Score: No data recorded

## 2023-10-05 NOTE — PROVATION PATIENT INSTRUCTIONS
Discharge Summary/Instructions after an Endoscopic Procedure  Patient Name: Forrest Benoit  Patient MRN: [id number]  Patient YOB: 1975  Thursday, October 5, 2023  Rio Tyler MD  Dear patient,  As a result of recent federal legislation (The Federal Cures Act), you may   receive lab or pathology results from your procedure in your [facility]   account before your physician is able to contact you. Your physician or   their representative will relay the results to you with their   recommendations at their soonest availability.  Thank you,  RESTRICTIONS:  During your procedure today, you received medications for sedation.  These   medications may affect your judgment, balance and coordination.  Therefore,   for 24 hours, you have the following restrictions:   - DO NOT drive a car, operate machinery, make legal/financial decisions,   sign important papers or drink alcohol.    ACTIVITY:  Today: no heavy lifting, straining or running due to procedural   sedation/anesthesia.  The following day: return to full activity including work.  DIET:  Eat and drink normally unless instructed otherwise.     TREATMENT FOR COMMON SIDE EFFECTS:  - Mild abdominal pain, nausea, belching, bloating or excessive gas:  rest,   eat lightly and use a heating pad.  - Sore Throat: treat with throat lozenges and/or gargle with warm salt   water.  - Because air was used during the procedure, expelling large amounts of air   from your rectum or belching is normal.  - If a bowel prep was taken, you may not have a bowel movement for 1-3 days.    This is normal.  SYMPTOMS TO WATCH FOR AND REPORT TO YOUR PHYSICIAN:  1. Abdominal pain or bloating, other than gas cramps.  2. Chest pain.  3. Back pain.  4. Signs of infection such as: chills or fever occurring within 24 hours   after the procedure.  5. Rectal bleeding, which would show as bright red, maroon, or black stools.   (A tablespoon of blood from the rectum is not serious, especially  if   hemorrhoids are present.)  6. Vomiting.  7. Weakness or dizziness.  GO DIRECTLY TO THE NEAREST EMERGENCY ROOM IF YOU HAVE ANY OF THE FOLLOWING:      Difficulty breathing              Chills and/or fever over 101 F   Persistent vomiting and/or vomiting blood   Severe abdominal pain   Severe chest pain   Black, tarry stools   Bleeding- more than one tablespoon   Any other symptom or condition that you feel may need urgent attention  Your doctor recommends these additional instructions:  If any biopsies were taken, your doctors clinic will contact you in 1 to 2   weeks with any results.  - Discharge patient to home (ambulatory).   - Resume previous diet today.   - Repeat colonoscopy in 5 years for surveillance.   - Return to GI clinic in 1 year.  For questions, problems or results please call your physician - Rio Tyler MD at Work:  [phone].  [facility] , EMERGENCY ROOM PHONE NUMBER: [phone]  IF A COMPLICATION OR EMERGENCY SITUATION ARISES AND YOU ARE UNABLE TO REACH   YOUR PHYSICIAN - GO DIRECTLY TO THE EMERGENCY ROOM.  MD Rio Lim MD  10/5/2023 9:24:16 AM  This report has been verified and signed electronically.  Dear patient,  As a result of recent federal legislation (The Federal Cures Act), you may   receive lab or pathology results from your procedure in your [facility]   account before your physician is able to contact you. Your physician or   their representative will relay the results to you with their   recommendations at their soonest availability.  Thank you,  PROVATION

## 2023-10-05 NOTE — TRANSFER OF CARE
Anesthesia Transfer of Care Note    Patient: Forrest Benoit Jr.    Procedure(s) Performed: Procedure(s) (LRB):  COLONOSCOPY (N/A)    Patient location: GI    Anesthesia Type: general    Post pain: adequate analgesia    Post assessment: no apparent anesthetic complications    Post vital signs: stable    Level of consciousness: awake    Nausea/Vomiting: no nausea/vomiting    Complications: none    Transfer of care protocol was followedComments: Report to Sonia WASHINGTON      Last vitals:   p79r 25 t36 102/65 sat 100 ra

## 2023-10-06 VITALS
BODY MASS INDEX: 31.76 KG/M2 | SYSTOLIC BLOOD PRESSURE: 144 MMHG | OXYGEN SATURATION: 99 % | WEIGHT: 186 LBS | HEIGHT: 64 IN | RESPIRATION RATE: 18 BRPM | DIASTOLIC BLOOD PRESSURE: 90 MMHG | HEART RATE: 78 BPM | TEMPERATURE: 98 F

## 2023-12-28 LAB
LEFT EYE DM RETINOPATHY: NEGATIVE
RIGHT EYE DM RETINOPATHY: NEGATIVE

## 2024-01-03 ENCOUNTER — DOCUMENTATION ONLY (OUTPATIENT)
Dept: INTERNAL MEDICINE | Facility: CLINIC | Age: 49
End: 2024-01-03
Payer: MEDICAID

## 2024-01-08 PROBLEM — K62.5 RECTAL BLEEDING: Status: RESOLVED | Noted: 2023-10-05 | Resolved: 2024-01-08

## 2024-01-11 NOTE — TELEPHONE ENCOUNTER
----- Message from LUCINA Simon sent at 9/19/2023  3:22 PM CDT -----  Please inform patient FIT stool kit is Positive. I have placed a referral to the GI clinic to be evaluated with a colonoscopy.    Thank you,  MABLE TobiasC  
I spoke with patient and informed him that his FIT results came back positive and NP sent a referral to GI for further testing.I informed patient that they will be calling him to schedule an appointment.  Patient verbalized understanding.  
by mouth daily  Rebecca Rivera MD   Respiratory Therapy Supplies (ONE FLOW SPIROMETER) CHE 1 Act by Does not apply route 4 times daily  Rebecca Rivera MD       Review of Systems   Constitutional:  Negative for activity change and fever.   HENT:  Negative for congestion, ear pain and sore throat.    Respiratory:  Negative for cough, chest tightness and shortness of breath.    Cardiovascular:  Negative for chest pain.   Gastrointestinal:  Negative for abdominal pain, diarrhea, nausea and vomiting.   Genitourinary:  Negative for frequency and urgency.   Musculoskeletal:  Negative for arthralgias and myalgias.   Skin:  Negative for color change.   Neurological:  Negative for dizziness, weakness and numbness.   Psychiatric/Behavioral:  Negative for agitation. The patient is not nervous/anxious.        /78 (Site: Left Upper Arm, Position: Sitting, Cuff Size: Medium Adult)   Pulse (!) 47   Temp 97.2 °F (36.2 °C) (Temporal)   Ht 1.778 m (5' 10\")   Wt 73.5 kg (162 lb)   SpO2 99%   BMI 23.24 kg/m²    Physical Exam  Constitutional:       Appearance: Normal appearance.   HENT:      Head: Normocephalic.      Right Ear: Tympanic membrane normal.      Left Ear: Tympanic membrane normal.      Nose: Nose normal.      Mouth/Throat:      Mouth: Mucous membranes are moist.      Pharynx: Oropharynx is clear.   Eyes:      Extraocular Movements: Extraocular movements intact.      Pupils: Pupils are equal, round, and reactive to light.   Cardiovascular:      Rate and Rhythm: Normal rate and regular rhythm.      Pulses: Normal pulses.      Heart sounds: Normal heart sounds.   Pulmonary:      Effort: Pulmonary effort is normal.      Breath sounds: Normal breath sounds.   Abdominal:      General: Bowel sounds are normal.      Palpations: Abdomen is soft.   Musculoskeletal:         General: Normal range of motion.      Cervical back: Normal range of motion.   Skin:     General: Skin is warm and dry.   Neurological:

## 2024-02-19 ENCOUNTER — LAB VISIT (OUTPATIENT)
Dept: LAB | Facility: HOSPITAL | Age: 49
End: 2024-02-19
Payer: MEDICAID

## 2024-02-19 DIAGNOSIS — N52.9 ERECTILE DYSFUNCTION, UNSPECIFIED ERECTILE DYSFUNCTION TYPE: ICD-10-CM

## 2024-02-19 DIAGNOSIS — E11.9 TYPE 2 DIABETES MELLITUS WITHOUT COMPLICATION, WITHOUT LONG-TERM CURRENT USE OF INSULIN: ICD-10-CM

## 2024-02-19 DIAGNOSIS — E78.2 MIXED HYPERLIPIDEMIA: ICD-10-CM

## 2024-02-19 DIAGNOSIS — E66.09 CLASS 1 OBESITY DUE TO EXCESS CALORIES WITH SERIOUS COMORBIDITY AND BODY MASS INDEX (BMI) OF 31.0 TO 31.9 IN ADULT: ICD-10-CM

## 2024-02-19 DIAGNOSIS — I10 PRIMARY HYPERTENSION: ICD-10-CM

## 2024-02-19 DIAGNOSIS — Z13.0 SCREENING FOR IRON DEFICIENCY ANEMIA: ICD-10-CM

## 2024-02-19 LAB
ALBUMIN SERPL-MCNC: 4.1 G/DL (ref 3.5–5)
ALBUMIN/GLOB SERPL: 1.1 RATIO (ref 1.1–2)
ALP SERPL-CCNC: 76 UNIT/L (ref 40–150)
ALT SERPL-CCNC: 53 UNIT/L (ref 0–55)
APPEARANCE UR: CLEAR
AST SERPL-CCNC: 18 UNIT/L (ref 5–34)
BACTERIA #/AREA URNS AUTO: ABNORMAL /HPF
BASOPHILS # BLD AUTO: 0.04 X10(3)/MCL
BASOPHILS NFR BLD AUTO: 0.7 %
BILIRUB SERPL-MCNC: 0.3 MG/DL
BILIRUB UR QL STRIP.AUTO: NEGATIVE
BUN SERPL-MCNC: 12.7 MG/DL (ref 8.9–20.6)
CALCIUM SERPL-MCNC: 9.9 MG/DL (ref 8.4–10.2)
CHLORIDE SERPL-SCNC: 106 MMOL/L (ref 98–107)
CHOLEST SERPL-MCNC: 190 MG/DL
CHOLEST/HDLC SERPL: 5 {RATIO} (ref 0–5)
CO2 SERPL-SCNC: 29 MMOL/L (ref 22–29)
COLOR UR AUTO: COLORLESS
CREAT SERPL-MCNC: 1.27 MG/DL (ref 0.73–1.18)
CREAT UR-MCNC: 51 MG/DL (ref 63–166)
DEPRECATED CALCIDIOL+CALCIFEROL SERPL-MC: 19.4 NG/ML (ref 30–80)
EOSINOPHIL # BLD AUTO: 0.3 X10(3)/MCL (ref 0–0.9)
EOSINOPHIL NFR BLD AUTO: 4.9 %
ERYTHROCYTE [DISTWIDTH] IN BLOOD BY AUTOMATED COUNT: 13.7 % (ref 11.5–17)
EST. AVERAGE GLUCOSE BLD GHB EST-MCNC: 148.5 MG/DL
FERRITIN SERPL-MCNC: 112.52 NG/ML (ref 21.81–274.66)
GFR SERPLBLD CREATININE-BSD FMLA CKD-EPI: >60 MLS/MIN/1.73/M2
GLOBULIN SER-MCNC: 3.6 GM/DL (ref 2.4–3.5)
GLUCOSE SERPL-MCNC: 144 MG/DL (ref 74–100)
GLUCOSE UR QL STRIP.AUTO: ABNORMAL
HBA1C MFR BLD: 6.8 %
HCT VFR BLD AUTO: 45.8 % (ref 42–52)
HDLC SERPL-MCNC: 37 MG/DL (ref 35–60)
HGB BLD-MCNC: 15.3 G/DL (ref 14–18)
HYALINE CASTS #/AREA URNS LPF: ABNORMAL /LPF
IMM GRANULOCYTES # BLD AUTO: 0.01 X10(3)/MCL (ref 0–0.04)
IMM GRANULOCYTES NFR BLD AUTO: 0.2 %
IRON SATN MFR SERPL: 23 % (ref 20–50)
IRON SERPL-MCNC: 71 UG/DL (ref 65–175)
KETONES UR QL STRIP.AUTO: NEGATIVE
LDLC SERPL CALC-MCNC: 117 MG/DL (ref 50–140)
LEUKOCYTE ESTERASE UR QL STRIP.AUTO: NEGATIVE
LYMPHOCYTES # BLD AUTO: 3.27 X10(3)/MCL (ref 0.6–4.6)
LYMPHOCYTES NFR BLD AUTO: 53.5 %
MCH RBC QN AUTO: 28.8 PG (ref 27–31)
MCHC RBC AUTO-ENTMCNC: 33.4 G/DL (ref 33–36)
MCV RBC AUTO: 86.1 FL (ref 80–94)
MICROALBUMIN UR-MCNC: <5 UG/ML
MICROALBUMIN/CREAT RATIO PNL UR: ABNORMAL
MONOCYTES # BLD AUTO: 0.57 X10(3)/MCL (ref 0.1–1.3)
MONOCYTES NFR BLD AUTO: 9.3 %
MUCOUS THREADS URNS QL MICRO: ABNORMAL /LPF
NEUTROPHILS # BLD AUTO: 1.92 X10(3)/MCL (ref 2.1–9.2)
NEUTROPHILS NFR BLD AUTO: 31.4 %
NITRITE UR QL STRIP.AUTO: NEGATIVE
NRBC BLD AUTO-RTO: 0 %
PH UR STRIP.AUTO: 6.5 [PH]
PLATELET # BLD AUTO: 300 X10(3)/MCL (ref 130–400)
PMV BLD AUTO: 9.3 FL (ref 7.4–10.4)
POTASSIUM SERPL-SCNC: 4.5 MMOL/L (ref 3.5–5.1)
PROT SERPL-MCNC: 7.7 GM/DL (ref 6.4–8.3)
PROT UR QL STRIP.AUTO: NEGATIVE
PSA SERPL-MCNC: 0.34 NG/ML
RBC # BLD AUTO: 5.32 X10(6)/MCL (ref 4.7–6.1)
RBC #/AREA URNS AUTO: ABNORMAL /HPF
RBC UR QL AUTO: NEGATIVE
SODIUM SERPL-SCNC: 143 MMOL/L (ref 136–145)
SP GR UR STRIP.AUTO: 1.02 (ref 1–1.03)
SQUAMOUS #/AREA URNS LPF: ABNORMAL /HPF
T4 FREE SERPL-MCNC: 0.77 NG/DL (ref 0.7–1.48)
TIBC SERPL-MCNC: 233 UG/DL (ref 69–240)
TIBC SERPL-MCNC: 304 UG/DL (ref 250–450)
TRANSFERRIN SERPL-MCNC: 277 MG/DL (ref 174–364)
TRIGL SERPL-MCNC: 179 MG/DL (ref 34–140)
TSH SERPL-ACNC: 1.23 UIU/ML (ref 0.35–4.94)
UROBILINOGEN UR STRIP-ACNC: NORMAL
VLDLC SERPL CALC-MCNC: 36 MG/DL
WBC # SPEC AUTO: 6.11 X10(3)/MCL (ref 4.5–11.5)
WBC #/AREA URNS AUTO: ABNORMAL /HPF

## 2024-02-19 PROCEDURE — 83540 ASSAY OF IRON: CPT

## 2024-02-19 PROCEDURE — 83036 HEMOGLOBIN GLYCOSYLATED A1C: CPT

## 2024-02-19 PROCEDURE — 84153 ASSAY OF PSA TOTAL: CPT

## 2024-02-19 PROCEDURE — 82728 ASSAY OF FERRITIN: CPT

## 2024-02-19 PROCEDURE — 85025 COMPLETE CBC W/AUTO DIFF WBC: CPT

## 2024-02-19 PROCEDURE — 80053 COMPREHEN METABOLIC PANEL: CPT

## 2024-02-19 PROCEDURE — 84439 ASSAY OF FREE THYROXINE: CPT

## 2024-02-19 PROCEDURE — 84443 ASSAY THYROID STIM HORMONE: CPT

## 2024-02-19 PROCEDURE — 36415 COLL VENOUS BLD VENIPUNCTURE: CPT

## 2024-02-19 PROCEDURE — 82306 VITAMIN D 25 HYDROXY: CPT

## 2024-02-19 PROCEDURE — 80061 LIPID PANEL: CPT

## 2024-02-19 PROCEDURE — 81001 URINALYSIS AUTO W/SCOPE: CPT

## 2024-02-19 PROCEDURE — 82043 UR ALBUMIN QUANTITATIVE: CPT

## 2024-02-20 ENCOUNTER — OFFICE VISIT (OUTPATIENT)
Dept: INTERNAL MEDICINE | Facility: CLINIC | Age: 49
End: 2024-02-20
Payer: MEDICAID

## 2024-02-20 VITALS
DIASTOLIC BLOOD PRESSURE: 72 MMHG | RESPIRATION RATE: 20 BRPM | BODY MASS INDEX: 33.77 KG/M2 | OXYGEN SATURATION: 98 % | HEART RATE: 92 BPM | TEMPERATURE: 98 F | HEIGHT: 64 IN | SYSTOLIC BLOOD PRESSURE: 108 MMHG | WEIGHT: 197.81 LBS

## 2024-02-20 DIAGNOSIS — K21.9 GASTROESOPHAGEAL REFLUX DISEASE WITHOUT ESOPHAGITIS: ICD-10-CM

## 2024-02-20 DIAGNOSIS — F17.210 CIGARETTE NICOTINE DEPENDENCE, UNCOMPLICATED: ICD-10-CM

## 2024-02-20 DIAGNOSIS — N52.9 ERECTILE DYSFUNCTION, UNSPECIFIED ERECTILE DYSFUNCTION TYPE: ICD-10-CM

## 2024-02-20 DIAGNOSIS — E55.9 VITAMIN D DEFICIENCY: ICD-10-CM

## 2024-02-20 DIAGNOSIS — E11.9 TYPE 2 DIABETES MELLITUS WITHOUT COMPLICATION, WITHOUT LONG-TERM CURRENT USE OF INSULIN: ICD-10-CM

## 2024-02-20 DIAGNOSIS — I10 PRIMARY HYPERTENSION: ICD-10-CM

## 2024-02-20 DIAGNOSIS — Z23 IMMUNIZATION DUE: ICD-10-CM

## 2024-02-20 DIAGNOSIS — Z00.00 WELL ADULT EXAM: Primary | ICD-10-CM

## 2024-02-20 DIAGNOSIS — E78.2 MIXED HYPERLIPIDEMIA: ICD-10-CM

## 2024-02-20 DIAGNOSIS — E66.09 CLASS 1 OBESITY DUE TO EXCESS CALORIES WITH SERIOUS COMORBIDITY AND BODY MASS INDEX (BMI) OF 33.0 TO 33.9 IN ADULT: ICD-10-CM

## 2024-02-20 PROCEDURE — 3008F BODY MASS INDEX DOCD: CPT | Mod: CPTII,,,

## 2024-02-20 PROCEDURE — 1160F RVW MEDS BY RX/DR IN RCRD: CPT | Mod: CPTII,,,

## 2024-02-20 PROCEDURE — 99214 OFFICE O/P EST MOD 30 MIN: CPT | Mod: PBBFAC

## 2024-02-20 PROCEDURE — 90472 IMMUNIZATION ADMIN EACH ADD: CPT | Mod: PBBFAC

## 2024-02-20 PROCEDURE — 3074F SYST BP LT 130 MM HG: CPT | Mod: CPTII,,,

## 2024-02-20 PROCEDURE — 3078F DIAST BP <80 MM HG: CPT | Mod: CPTII,,,

## 2024-02-20 PROCEDURE — 90677 PCV20 VACCINE IM: CPT | Mod: PBBFAC

## 2024-02-20 PROCEDURE — 90471 IMMUNIZATION ADMIN: CPT | Mod: PBBFAC

## 2024-02-20 PROCEDURE — 1159F MED LIST DOCD IN RCRD: CPT | Mod: CPTII,,,

## 2024-02-20 PROCEDURE — 3066F NEPHROPATHY DOC TX: CPT | Mod: CPTII,,,

## 2024-02-20 PROCEDURE — 99396 PREV VISIT EST AGE 40-64: CPT | Mod: S$PBB,,,

## 2024-02-20 PROCEDURE — 4010F ACE/ARB THERAPY RXD/TAKEN: CPT | Mod: CPTII,,,

## 2024-02-20 PROCEDURE — 3061F NEG MICROALBUMINURIA REV: CPT | Mod: CPTII,,,

## 2024-02-20 PROCEDURE — 3044F HG A1C LEVEL LT 7.0%: CPT | Mod: CPTII,,,

## 2024-02-20 PROCEDURE — 99214 OFFICE O/P EST MOD 30 MIN: CPT | Mod: S$PBB,25,,

## 2024-02-20 PROCEDURE — 90686 IIV4 VACC NO PRSV 0.5 ML IM: CPT | Mod: PBBFAC

## 2024-02-20 RX ORDER — PANTOPRAZOLE SODIUM 40 MG/1
40 TABLET, DELAYED RELEASE ORAL DAILY
Qty: 90 TABLET | Refills: 1 | Status: SHIPPED | OUTPATIENT
Start: 2024-02-20 | End: 2025-02-19

## 2024-02-20 RX ORDER — ASPIRIN 81 MG/1
81 TABLET ORAL DAILY
Qty: 90 TABLET | Refills: 1 | Status: SHIPPED | OUTPATIENT
Start: 2024-02-20 | End: 2025-02-19

## 2024-02-20 RX ORDER — EZETIMIBE 10 MG/1
10 TABLET ORAL DAILY
Qty: 90 TABLET | Refills: 1 | Status: SHIPPED | OUTPATIENT
Start: 2024-02-20 | End: 2025-02-19

## 2024-02-20 RX ORDER — GLIPIZIDE 5 MG/1
5 TABLET, FILM COATED, EXTENDED RELEASE ORAL
Qty: 90 TABLET | Refills: 1 | Status: SHIPPED | OUTPATIENT
Start: 2024-02-20 | End: 2025-02-19

## 2024-02-20 RX ORDER — ATORVASTATIN CALCIUM 80 MG/1
80 TABLET, FILM COATED ORAL DAILY
Qty: 90 TABLET | Refills: 3 | Status: SHIPPED | OUTPATIENT
Start: 2024-02-20 | End: 2025-02-19

## 2024-02-20 RX ORDER — SILDENAFIL 25 MG/1
25 TABLET, FILM COATED ORAL DAILY PRN
Qty: 30 TABLET | Refills: 1 | Status: SHIPPED | OUTPATIENT
Start: 2024-02-20 | End: 2025-02-19

## 2024-02-20 RX ORDER — AMLODIPINE BESYLATE 5 MG/1
5 TABLET ORAL DAILY
Qty: 90 TABLET | Refills: 1 | Status: SHIPPED | OUTPATIENT
Start: 2024-02-20 | End: 2025-02-19

## 2024-02-20 RX ORDER — ASPIRIN 325 MG
50000 TABLET, DELAYED RELEASE (ENTERIC COATED) ORAL
Qty: 12 CAPSULE | Refills: 0 | Status: SHIPPED | OUTPATIENT
Start: 2024-02-20

## 2024-02-20 RX ORDER — LIRAGLUTIDE 6 MG/ML
1.8 INJECTION SUBCUTANEOUS DAILY
Qty: 36 ML | Refills: 1 | Status: SHIPPED | OUTPATIENT
Start: 2024-02-20 | End: 2025-02-19

## 2024-02-20 RX ORDER — LOSARTAN POTASSIUM 25 MG/1
25 TABLET ORAL DAILY
Qty: 90 TABLET | Refills: 1 | Status: SHIPPED | OUTPATIENT
Start: 2024-02-20 | End: 2025-02-19

## 2024-02-20 RX ORDER — CANAGLIFLOZIN AND METFORMIN HYDROCHLORIDE 150; 1000 MG/1; MG/1
1 TABLET, FILM COATED ORAL 2 TIMES DAILY
Qty: 180 TABLET | Refills: 1 | Status: SHIPPED | OUTPATIENT
Start: 2024-02-20 | End: 2025-02-19

## 2024-02-20 RX ADMIN — PNEUMOCOCCAL 20-VALENT CONJUGATE VACCINE 0.5 ML
2.2; 2.2; 2.2; 2.2; 2.2; 2.2; 2.2; 2.2; 2.2; 2.2; 2.2; 2.2; 2.2; 2.2; 2.2; 2.2; 4.4; 2.2; 2.2; 2.2 INJECTION, SUSPENSION INTRAMUSCULAR at 01:02

## 2024-02-20 RX ADMIN — INFLUENZA VIRUS VACCINE 0.5 ML: 15; 15; 15; 15 SUSPENSION INTRAMUSCULAR at 01:02

## 2024-02-20 NOTE — ASSESSMENT & PLAN NOTE
Lab Results   Component Value Date    HGBA1C 6.8 02/19/2024    HGBA1C 6.7 09/18/2023    HGBA1C 10.7 (H) 06/20/2023    HGBA1C 10.1 (H) 02/28/2023     At goal.  Continue Invokamet, glipizide, and victoza as prescribed - refilled today.  Follow ADA diet.   Check blood glucose twice daily and as needed. Bring log to every office visit.  Avoid soda, simple sweets, and limit rice/pasta/bread/starches and consume brown options when possible.    Maintain healthy weight with BMI goal <30.    Perform aerobic exercise for 150 minutes per week (or 5 days a week for 30 minutes each day).    Examine feet daily.    Obtain annual dilated eye exam.   Kidney Protection: Losartan  Statin Therapy: Simvastatin  Eye exam: 6/16/2023  Foot exam:  8/24/2023

## 2024-02-20 NOTE — PROGRESS NOTES
PATIENT NAME: Forrest Benoit Jr.  : 1975  DATE: 24  MRN: [id number]          Reason for Visit/Chief Complaint   Follow-up, Annual Exam, and Gastroesophageal Reflux       History of Present Illness (HPI)     Forrest Benoit Jr. is a 48 y.o. Black male presenting in clinic today for Follow-up, Annual Exam, and Gastroesophageal Reflux. PMH HTN, DM2, HLD, and ALIF. He is followed by Dr. John Juáerz (neurosurgeon). [place] He is also followed by [facility] clinic.     All pertinent labs dated 2024 reviewed and discussed with patient.     [id number] - at goal. He reports regularly checking blood sugar - reports fasting glucose 118 this morning - no log presented in clinic. Patient states he has not been following an ADA diet. He reports compliance with glipizide, invokamet, and victoza. Denies polyuria, polydipsia, or polyphagia.     GERD: Patient complains of heartburn. This has been associated with belching.  He denies no other symptoms. Symptoms have been present for a few months. He denies dysphagia.  He has not lost weight. He denies melena, hematochezia, hematemesis, and coffee ground emesis. Medical therapy in the past has included none.     Smokes cigars a few times per week. Drinks alcohol occasionally, denies illicit drug use. Denies chest pain, shortness of breath, cough, headache, dizziness, weakness, abdominal pain, nausea, vomiting, diarrhea, constipation, dysuria, depression, anxiety, SI, and HI.     Prostate Cancer Screening - 2024 - PSA 0.34. Follow up annually.   Colon Cancer Screening - 10/5/2023 - Colonoscopy : diverticulosis. Repeat in 5 years.  Eye Exam - 2023. Followed by [facility] eye clinic.  Vaccinations: Flu - 2024 / Pneumococcal - 2024 / Tetanus - 9/15/2020 / Covid - 2021, 2021, 1/3/2022          Review of Systems     Review of Systems   Constitutional: Negative.    HENT: Negative.     Eyes: Negative.  "   Respiratory: Negative.     Cardiovascular: Negative.    Gastrointestinal:         Reflux   Endocrine: Negative.    Genitourinary: Negative.    Musculoskeletal: Negative.    Skin: Negative.    Allergic/Immunologic: Negative.    Neurological: Negative.    Hematological: Negative.    Psychiatric/Behavioral: Negative.     All other systems reviewed and are negative.      Medical / Social / Family History     Past Medical History:   Diagnosis Date    Dependence on nicotine from cigarettes     HTN (hypertension)     Lumbar disc herniation     Mixed hyperlipidemia     Myopia, bilateral     Type 2 diabetes mellitus without complication, with long-term current use of insulin          Past Surgical History:   Procedure Laterality Date    COLONOSCOPY N/A 10/5/2023    Procedure: COLONOSCOPY;  Surgeon: Rio Tyler MD;  Location: [facility] [place] ENDOSCOPY;  Service: Endoscopy;  Laterality: N/A;    FLUOROSCOPIC GUIDANCE FOR SPINAL INJECTION           Social History  Forrest Benoit Jr.'s  reports that he has been smoking cigarettes and cigars. He has never used smokeless tobacco. He reports current alcohol use. He reports that he does not use drugs.    Family History  Forrest Benoit Jr.'s family history includes Diabetes type II in his father; Hypertension in his father.    Medications and Allergies     Medications  Current Outpatient Medications   Medication Instructions    amLODIPine (NORVASC) 5 mg, Oral, Daily    aspirin (ECOTRIN) 81 mg, Oral, Daily    atorvastatin (LIPITOR) 80 mg, Oral, Daily    canagliflozin-metformin (INVOKAMET) 150-1,000 mg Tab 1 tablet, Oral, 2 times daily    cholecalciferol (vitamin D3) 50,000 Units, Oral, Every 7 days    ezetimibe (ZETIA) 10 mg, Oral, Daily    glipiZIDE 5 mg, Oral, With breakfast    losartan (COZAAR) 25 mg, Oral, Daily    pantoprazole (PROTONIX) 40 mg, Oral, Daily    sildenafiL (VIAGRA) 25 mg, Oral, Daily PRN    TECHLITE PEN NEEDLE 32 gauge x 5/32" Ndle USE TO ADMINISTER VICTOZA " "DAILY    VICTOZA 2-CYN 1.8 mg, Subcutaneous, Daily       Allergies  Review of patient's allergies indicates:  No Known Allergies    Physical Examination   Visit Vitals  /72 (BP Location: Right arm, Patient Position: Sitting, BP Method: Large (Automatic))   Pulse 92   Temp 98 °F (36.7 °C) (Oral)   Resp 20   Ht 5' 4" (1.626 m)   Wt 89.7 kg (197 lb 12.8 oz)   SpO2 98%   BMI 33.95 kg/m²     Physical Exam  Vitals reviewed.   Constitutional:       Appearance: Normal appearance. He is normal weight.   HENT:      Head: Normocephalic.      Nose: Nose normal.      Mouth/Throat:      Mouth: Mucous membranes are moist.      Pharynx: Oropharynx is clear.   Eyes:      Extraocular Movements: Extraocular movements intact.      Conjunctiva/sclera: Conjunctivae normal.      Pupils: Pupils are equal, round, and reactive to light.   Cardiovascular:      Rate and Rhythm: Normal rate and regular rhythm.      Heart sounds: Normal heart sounds.   Pulmonary:      Effort: Pulmonary effort is normal.      Breath sounds: Normal breath sounds.   Abdominal:      General: Abdomen is flat. Bowel sounds are normal.      Palpations: Abdomen is soft.   Musculoskeletal:         General: Normal range of motion.      Cervical back: Normal range of motion.   Skin:     General: Skin is warm and dry.      Capillary Refill: Capillary refill takes less than 2 seconds.   Neurological:      General: No focal deficit present.      Mental Status: He is alert and oriented to person, place, and time.   Psychiatric:         Mood and Affect: Mood normal.           Results     Lab Results   Component Value Date    WBC 6.11 02/19/2024    RBC 5.32 02/19/2024    HGB 15.3 02/19/2024    HCT 45.8 02/19/2024    MCV 86.1 02/19/2024    MCH 28.8 02/19/2024    MCHC 33.4 02/19/2024    RDW 13.7 02/19/2024     02/19/2024    MPV 9.3 02/19/2024      Lab Results   Component Value Date     02/19/2024    K 4.5 02/19/2024    CHLORIDE 106 02/19/2024    CO2 29 " "02/19/2024    GLUCOSE 144 (H) 02/19/2024    BUN 12.7 02/19/2024    CREATININE 1.27 (H) 02/19/2024    LABPROT 7.7 02/19/2024    ALBUMIN 4.1 02/19/2024    BILITOT 0.3 02/19/2024    ALKPHOS 76 02/19/2024    AST 18 02/19/2024    ALT 53 02/19/2024    EGFRNORACEVR >60 02/19/2024     Lab Results   Component Value Date    TSH 1.229 02/19/2024     Lab Results   Component Value Date    CHOL 190 02/19/2024    HDL 37 02/19/2024    .00 02/19/2024    TRIG 179 (H) 02/19/2024     Lab Results   Component Value Date    COLORUA Colorless (A) 02/19/2024    SGUA 1.017 02/19/2024    PROTEINUA Negative 02/19/2024    GLUCOSEUA 4+ (A) 02/19/2024    BILIRUBINUA Negative 02/19/2024    BLOODUA Negative 02/19/2024    WBCUA 0-5 02/19/2024    RBCUA 0-5 02/19/2024    BACTERIA None Seen 02/19/2024    NITRITESUA Negative 02/19/2024    LEUKOCYTESUR Negative 02/19/2024    UROBILINOGEN Normal 02/19/2024     Lab Results   Component Value Date    CREATRANDUR 51.0 (L) 02/19/2024    MICALBCREAT  02/19/2024      Comment:      Unable to calculate     Lab Results   Component Value Date    HQWMCWBP10QY 19.4 (L) 02/19/2024    FOLATE 11.2 11/10/2020     Lab Results   Component Value Date    HIV Nonreactive 11/30/2022    HEPAIGM Nonreactive 11/30/2022    HEPBCOREM Nonreactive 11/30/2022    HEPCAB Nonreactive 11/30/2022     No results found for: "FITDIAG", "COLOGUARD"  Lab Results   Component Value Date    OCCBLDIA Positive (A) 09/19/2023       Assessment and Plan (including Health Maintenance)     Problem List Items Addressed This Visit          Cardiac/Vascular    Mixed hyperlipidemia    Current Assessment & Plan     Lab Results   Component Value Date    .00 02/19/2024       Lab Results   Component Value Date    TRIG 179 (H) 02/19/2024       Lab Results   Component Value Date    HDL 37 02/19/2024      Lab Results   Component Value Date    CHOL 190 02/19/2024   LDL not at goal.  D/c simvastatin.  Rx atorvastatin 80 mg (high intensity).  Continue " zetia as prescribed - refilled today.   Follow a low cholesterol, low saturated fat diet with less than 200 mg of cholesterol a day.   Avoid fried foods and high saturated fats.  Add flax seed or fish oil supplements to diet.   Increase dietary fiber.   Regular exercise improves cholesterol levels.  Physical activity 5 times a week for 30 minutes per day (or 150 minutes per week).   Stressed importance of dietary modifications.          Relevant Medications    ezetimibe (ZETIA) 10 mg tablet    atorvastatin (LIPITOR) 80 MG tablet    Other Relevant Orders    Lipid Panel    Primary hypertension    Current Assessment & Plan     BP Readings from Last 3 Encounters:   10/05/23 (!) 144/90   09/18/23 133/88   03/01/23 113/80      At goal.  Follow a low sodium (less than 2 grams of sodium per day), DASH diet.   Continue amlodipine, losartan as prescribed - refilled today.  Monitor blood pressure and report any consistent values greater than 140/90 and keep a log.  Encouraged smoking cessation to aid in BP reduction and co-morbidities.   Maintain healthy weight with a BMI goal of <30.   Aerobic exercise for 150 minutes per week (or 5 days a week for 30 minutes each day).          Relevant Medications    amLODIPine (NORVASC) 5 MG tablet    aspirin (ECOTRIN) 81 MG EC tablet    losartan (COZAAR) 25 MG tablet    Other Relevant Orders    CBC Auto Differential    Comprehensive Metabolic Panel    Microalbumin/Creatinine Ratio, Urine    Urinalysis, Reflex to Urine Culture       Renal/    Erectile dysfunction    Current Assessment & Plan     Continue sildenafil as prescribed - refilled today.  Testosterone panel previously ordered - but not collected.  Testosterone panel re-ordered.  Report to the ED immediately if you experience any of the following symptoms:  chest pain, lightheadedness, dizziness, SOB, hypotension, nausea, vomiting, or an erection that lasts longer than 4 hours.   Stop taking the medicine immediately!           Relevant Medications    sildenafiL (VIAGRA) 25 MG tablet    Other Relevant Orders    Testosterone       ID    Immunization due    Current Assessment & Plan     Verbal consent obtained.  Influenza and Pneumococcal vaccine was administered.  Ok to take acetaminophen for soreness of arm.          Relevant Medications    influenza (QUADRIVALENT PF) vaccine 0.5 mL (Completed)    pneumoc 20-jeanne conj-dip cr(PF) (PREVNAR-20 (PF)) injection Syrg 0.5 mL (Completed)       Endocrine    Type 2 diabetes mellitus without complication, without long-term current use of insulin    Current Assessment & Plan     Lab Results   Component Value Date    HGBA1C 6.8 02/19/2024    HGBA1C 6.7 09/18/2023    HGBA1C 10.7 (H) 06/20/2023    HGBA1C 10.1 (H) 02/28/2023   At goal.  Continue Invokamet, glipizide, and victoza as prescribed - refilled today.  Follow ADA diet.   Check blood glucose twice daily and as needed. Bring log to every office visit.  Avoid soda, simple sweets, and limit rice/pasta/bread/starches and consume brown options when possible.    Maintain healthy weight with BMI goal <30.    Perform aerobic exercise for 150 minutes per week (or 5 days a week for 30 minutes each day).    Examine feet daily.    Obtain annual dilated eye exam.   Kidney Protection: Losartan  Statin Therapy: Simvastatin  Eye exam: 6/16/2023  Foot exam:  8/24/2023         Relevant Medications    canagliflozin-metformin (INVOKAMET) 150-1,000 mg Tab    glipiZIDE 5 MG TR24    losartan (COZAAR) 25 MG tablet    liraglutide 0.6 mg/0.1 mL, 18 mg/3 mL, subq PNIJ (VICTOZA 2-CYN) 0.6 mg/0.1 mL (18 mg/3 mL) PnIj pen    Other Relevant Orders    Comprehensive Metabolic Panel    Microalbumin/Creatinine Ratio, Urine    Urinalysis, Reflex to Urine Culture    Hemoglobin A1C    Class 1 obesity due to excess calories with serious comorbidity and body mass index (BMI) of 33.0 to 33.9 in adult    Vitamin D deficiency    Relevant Medications    cholecalciferol, vitamin D3, 1,250 mcg  (50,000 unit) capsule    Other Relevant Orders    Vitamin D       GI    Gastroesophageal reflux disease without esophagitis    Current Assessment & Plan     Rx pantoprazole.  Avoid spicy, acidic, fried food and alcohol.    Eat 2-3 hours before bed.   Avoid tight fitting clothes and chew food thoroughly.    Reduce caffeine intake, avoid soda.    Take meds as prescribed.    Encouraged smoking cessation.           Relevant Medications    pantoprazole (PROTONIX) 40 MG tablet       Other    Cigarette nicotine dependence, uncomplicated    Current Assessment & Plan     Dangers of cigarette smoking were reviewed with patient in detail. Patient was Counseled for 3 minutes. Nicotine replacement options were discussed. Nicotine replacement was discussed- not prescribed per patient's request.         Well adult exam - Primary    Current Assessment & Plan     Wellness labs - 2/19/2024.   Prostate Cancer Screening - 2/19/2024 - PSA 0.34. Follow up annually.   Colon Cancer Screening - 10/5/2023 - Colonoscopy : diverticulosis. Repeat in 5 years.  Eye Exam - 12/28/2023. Followed by [facility] eye clinic.  Vaccinations: Flu - 2/20/2024 / Pneumococcal - 2/20/2024 / Tetanus - 9/15/2020 / Covid - 5/21/2021, 6/22/2021, 1/3/2022               Health Maintenance Due   Topic Date Due    COVID-19 Vaccine (4 - 2023-24 season) 09/01/2023     Tests to Keep You Healthy    Eye Exam: Met on 12/28/2023  Colon Cancer Screening: Met on 10/5/2023  Last Blood Pressure <= 139/89 (2/20/2024): Yes  Last HbA1c < 8 (02/19/2024): Yes  Tobacco Cessation: NO      Health Maintenance Topics with due status: Not Due       Topic Last Completion Date    TETANUS VACCINE 09/15/2020    Colorectal Cancer Screening 10/05/2023    Eye Exam 12/28/2023    Diabetes Urine Screening 02/19/2024    Lipid Panel 02/19/2024    Hemoglobin A1c 02/19/2024    Low Dose Statin 02/20/2024    Foot Exam 02/20/2024       Follow up in about 6 months (around 8/20/2024) for F2F, Follow up, Med check,  Lab review, RTC PRN.    Future Appointments   Date Time Provider Department Center   3/1/2024 10:00 AM [facility] [place]    8/20/2024  9:00 AM Jeanne Mahmood FNP [facility]        Signature:        LUCINA Simon  [facility]  [address] [id number]    Date of encounter: 2/20/24

## 2024-02-20 NOTE — PATIENT INSTRUCTIONS
REMINDER: Please complete labs within 1 week of appointment.   Please complete satisfaction survey when received. Thank you.    Paul Clayton,     If you are due for any health screening(s) below please notify me so we can arrange them to be ordered and scheduled. Most healthy patients at your age complete them, but you are free to accept or refuse.     If you can't do it, I'll definitely understand. If you can, I'd certainly appreciate it!    All of your core healthy metrics are met.      Were here to help you quit smoking     Our records indicated that you are still smoking. One of the best things you can do for your health is to stop smoking and we are here to help.     Talk with your provider about our Smoking Cessation Program and how we can support you on your journey.

## 2024-02-20 NOTE — ASSESSMENT & PLAN NOTE
Continue sildenafil as prescribed - refilled today.  Testosterone panel previously ordered - but not collected.  Testosterone panel re-ordered.  Report to the ED immediately if you experience any of the following symptoms:  chest pain, lightheadedness, dizziness, SOB, hypotension, nausea, vomiting, or an erection that lasts longer than 4 hours.   Stop taking the medicine immediately!

## 2024-02-20 NOTE — ASSESSMENT & PLAN NOTE
Lab Results   Component Value Date    .00 02/19/2024       Lab Results   Component Value Date    TRIG 179 (H) 02/19/2024       Lab Results   Component Value Date    HDL 37 02/19/2024        Lab Results   Component Value Date    CHOL 190 02/19/2024     LDL not at goal.  D/c simvastatin.  Rx atorvastatin 80 mg (high intensity).  Continue zetia as prescribed - refilled today.   Follow a low cholesterol, low saturated fat diet with less than 200 mg of cholesterol a day.   Avoid fried foods and high saturated fats.  Add flax seed or fish oil supplements to diet.   Increase dietary fiber.   Regular exercise improves cholesterol levels.  Physical activity 5 times a week for 30 minutes per day (or 150 minutes per week).   Stressed importance of dietary modifications.

## 2024-02-20 NOTE — ASSESSMENT & PLAN NOTE
BP Readings from Last 3 Encounters:   10/05/23 (!) 144/90   09/18/23 133/88   03/01/23 113/80      At goal.  Follow a low sodium (less than 2 grams of sodium per day), DASH diet.   Continue amlodipine, losartan as prescribed - refilled today.  Monitor blood pressure and report any consistent values greater than 140/90 and keep a log.  Encouraged smoking cessation to aid in BP reduction and co-morbidities.   Maintain healthy weight with a BMI goal of <30.   Aerobic exercise for 150 minutes per week (or 5 days a week for 30 minutes each day).

## 2024-02-20 NOTE — ASSESSMENT & PLAN NOTE
Wellness labs - 2/19/2024.   Prostate Cancer Screening - 2/19/2024 - PSA 0.34. Follow up annually.   Colon Cancer Screening - 10/5/2023 - Colonoscopy : diverticulosis. Repeat in 5 years.  Eye Exam - 12/28/2023. Followed by [facility] eye clinic.  Vaccinations: Flu - 2/20/2024 / Pneumococcal - 2/20/2024 / Tetanus - 9/15/2020 / Covid - 5/21/2021, 6/22/2021, 1/3/2022

## 2024-02-20 NOTE — ASSESSMENT & PLAN NOTE
Rx pantoprazole.  Avoid spicy, acidic, fried food and alcohol.    Eat 2-3 hours before bed.   Avoid tight fitting clothes and chew food thoroughly.    Reduce caffeine intake, avoid soda.    Take meds as prescribed.    Encouraged smoking cessation.

## 2024-05-27 PROBLEM — Z00.00 WELL ADULT EXAM: Status: RESOLVED | Noted: 2024-02-20 | Resolved: 2024-05-27

## 2024-08-19 ENCOUNTER — APPOINTMENT (OUTPATIENT)
Dept: LAB | Facility: HOSPITAL | Age: 49
End: 2024-08-19
Payer: MEDICAID

## 2024-08-20 ENCOUNTER — OFFICE VISIT (OUTPATIENT)
Dept: INTERNAL MEDICINE | Facility: CLINIC | Age: 49
End: 2024-08-20
Payer: MEDICAID

## 2024-08-20 VITALS
BODY MASS INDEX: 32.76 KG/M2 | SYSTOLIC BLOOD PRESSURE: 127 MMHG | TEMPERATURE: 98 F | OXYGEN SATURATION: 98 % | DIASTOLIC BLOOD PRESSURE: 85 MMHG | HEIGHT: 64 IN | HEART RATE: 96 BPM | RESPIRATION RATE: 16 BRPM | WEIGHT: 191.88 LBS

## 2024-08-20 DIAGNOSIS — Z72.0 TOBACCO USER: ICD-10-CM

## 2024-08-20 DIAGNOSIS — K21.9 GASTROESOPHAGEAL REFLUX DISEASE WITHOUT ESOPHAGITIS: ICD-10-CM

## 2024-08-20 DIAGNOSIS — N52.9 ERECTILE DYSFUNCTION, UNSPECIFIED ERECTILE DYSFUNCTION TYPE: ICD-10-CM

## 2024-08-20 DIAGNOSIS — E66.09 CLASS 1 OBESITY DUE TO EXCESS CALORIES WITH SERIOUS COMORBIDITY AND BODY MASS INDEX (BMI) OF 32.0 TO 32.9 IN ADULT: ICD-10-CM

## 2024-08-20 DIAGNOSIS — E78.2 MIXED HYPERLIPIDEMIA: ICD-10-CM

## 2024-08-20 DIAGNOSIS — M54.50 CHRONIC BILATERAL LOW BACK PAIN WITHOUT SCIATICA: ICD-10-CM

## 2024-08-20 DIAGNOSIS — I10 PRIMARY HYPERTENSION: ICD-10-CM

## 2024-08-20 DIAGNOSIS — E11.9 TYPE 2 DIABETES MELLITUS WITHOUT COMPLICATION, WITHOUT LONG-TERM CURRENT USE OF INSULIN: Primary | ICD-10-CM

## 2024-08-20 DIAGNOSIS — E55.9 VITAMIN D INSUFFICIENCY: ICD-10-CM

## 2024-08-20 DIAGNOSIS — G89.29 CHRONIC BILATERAL LOW BACK PAIN WITHOUT SCIATICA: ICD-10-CM

## 2024-08-20 PROCEDURE — 99214 OFFICE O/P EST MOD 30 MIN: CPT | Mod: PBBFAC

## 2024-08-20 PROCEDURE — 3074F SYST BP LT 130 MM HG: CPT | Mod: CPTII,,,

## 2024-08-20 PROCEDURE — 1159F MED LIST DOCD IN RCRD: CPT | Mod: CPTII,,,

## 2024-08-20 PROCEDURE — 3079F DIAST BP 80-89 MM HG: CPT | Mod: CPTII,,,

## 2024-08-20 PROCEDURE — 99214 OFFICE O/P EST MOD 30 MIN: CPT | Mod: S$PBB,,,

## 2024-08-20 PROCEDURE — 1160F RVW MEDS BY RX/DR IN RCRD: CPT | Mod: CPTII,,,

## 2024-08-20 PROCEDURE — 3008F BODY MASS INDEX DOCD: CPT | Mod: CPTII,,,

## 2024-08-20 PROCEDURE — 4010F ACE/ARB THERAPY RXD/TAKEN: CPT | Mod: CPTII,,,

## 2024-08-20 PROCEDURE — 3066F NEPHROPATHY DOC TX: CPT | Mod: CPTII,,,

## 2024-08-20 PROCEDURE — 3052F HG A1C>EQUAL 8.0%<EQUAL 9.0%: CPT | Mod: CPTII,,,

## 2024-08-20 PROCEDURE — 3061F NEG MICROALBUMINURIA REV: CPT | Mod: CPTII,,,

## 2024-08-20 RX ORDER — PANTOPRAZOLE SODIUM 40 MG/1
40 TABLET, DELAYED RELEASE ORAL DAILY
Qty: 90 TABLET | Refills: 2 | Status: SHIPPED | OUTPATIENT
Start: 2024-08-20 | End: 2025-08-20

## 2024-08-20 RX ORDER — CANAGLIFLOZIN AND METFORMIN HYDROCHLORIDE 150; 1000 MG/1; MG/1
1 TABLET, FILM COATED ORAL 2 TIMES DAILY
Qty: 180 TABLET | Refills: 2 | Status: SHIPPED | OUTPATIENT
Start: 2024-08-20 | End: 2025-08-20

## 2024-08-20 RX ORDER — ATORVASTATIN CALCIUM 80 MG/1
80 TABLET, FILM COATED ORAL DAILY
Qty: 90 TABLET | Refills: 3 | Status: SHIPPED | OUTPATIENT
Start: 2024-08-20 | End: 2025-08-20

## 2024-08-20 RX ORDER — SILDENAFIL 25 MG/1
25 TABLET, FILM COATED ORAL DAILY PRN
Qty: 30 TABLET | Refills: 4 | Status: SHIPPED | OUTPATIENT
Start: 2024-08-20 | End: 2025-08-20

## 2024-08-20 RX ORDER — EZETIMIBE 10 MG/1
10 TABLET ORAL DAILY
Qty: 90 TABLET | Refills: 2 | Status: SHIPPED | OUTPATIENT
Start: 2024-08-20 | End: 2025-08-20

## 2024-08-20 RX ORDER — SEMAGLUTIDE 0.68 MG/ML
0.5 INJECTION, SOLUTION SUBCUTANEOUS
Qty: 3 ML | Refills: 0 | Status: SHIPPED | OUTPATIENT
Start: 2024-08-20 | End: 2025-08-20

## 2024-08-20 RX ORDER — LOSARTAN POTASSIUM 25 MG/1
25 TABLET ORAL DAILY
Qty: 90 TABLET | Refills: 2 | Status: SHIPPED | OUTPATIENT
Start: 2024-08-20 | End: 2025-08-20

## 2024-08-20 RX ORDER — GLIPIZIDE 5 MG/1
5 TABLET, FILM COATED, EXTENDED RELEASE ORAL
Qty: 90 TABLET | Refills: 2 | Status: SHIPPED | OUTPATIENT
Start: 2024-08-20 | End: 2025-08-20

## 2024-08-20 RX ORDER — AMLODIPINE BESYLATE 5 MG/1
5 TABLET ORAL DAILY
Qty: 90 TABLET | Refills: 2 | Status: SHIPPED | OUTPATIENT
Start: 2024-08-20 | End: 2025-08-20

## 2024-08-20 RX ORDER — METHOCARBAMOL 750 MG/1
750 TABLET, FILM COATED ORAL 3 TIMES DAILY PRN
Qty: 60 TABLET | Refills: 2 | Status: SHIPPED | OUTPATIENT
Start: 2024-08-20 | End: 2025-08-20

## 2024-08-20 RX ORDER — IBUPROFEN 800 MG/1
800 TABLET ORAL 3 TIMES DAILY PRN
Qty: 60 TABLET | Refills: 3 | Status: SHIPPED | OUTPATIENT
Start: 2024-08-20 | End: 2025-08-20

## 2024-08-20 RX ORDER — SEMAGLUTIDE 1.34 MG/ML
1 INJECTION, SOLUTION SUBCUTANEOUS
Qty: 3 ML | Refills: 4 | Status: SHIPPED | OUTPATIENT
Start: 2024-09-09 | End: 2025-09-09

## 2024-08-20 NOTE — ASSESSMENT & PLAN NOTE
Continue pantoprazole as prescribed.  Avoid spicy, acidic, fried food and alcohol.    Eat 2-3 hours before bed.   Avoid tight fitting clothes and chew food thoroughly.    Reduce caffeine intake, avoid soda.    Take meds as prescribed.    Encouraged smoking cessation.

## 2024-08-20 NOTE — ASSESSMENT & PLAN NOTE
BP Readings from Last 3 Encounters:   02/20/24 108/72   10/05/23 (!) 144/90   09/18/23 133/88      At goal.  Follow a low sodium (less than 2 grams of sodium per day), DASH diet.   Continue amlodipine, losartan as prescribed - refilled today.  Monitor blood pressure and report any consistent values greater than 140/90 and keep a log.  Encouraged smoking cessation to aid in BP reduction and co-morbidities.   Maintain healthy weight with a BMI goal of <30.   Aerobic exercise for 150 minutes per week (or 5 days a week for 30 minutes each day).

## 2024-08-20 NOTE — ASSESSMENT & PLAN NOTE
Latest Reference Range & Units 08/19/24 07:49   Testosterone Total 240.24 - 870.68 ng/dL 279.06     Continue sildenafil as prescribed - refilled today.  Report to the ED immediately if you experience any of the following symptoms:  chest pain, lightheadedness, dizziness, SOB, hypotension, nausea, vomiting, or an erection that lasts longer than 4 hours.   Stop taking the medicine immediately!

## 2024-08-20 NOTE — ASSESSMENT & PLAN NOTE
Lab Results   Component Value Date    HGBA1C 8.9 (H) 08/19/2024    HGBA1C 6.8 02/19/2024    HGBA1C 6.7 09/18/2023    HGBA1C 10.7 (H) 06/20/2023     At goal.  Continue Invokamet, glipizide as prescribed - refilled today.  D/c victoza - not on formulary.  Rx ozempic 0.5 mg weekly x4 weeks, then 1 mg weekly.  Repeat HgbA1c in 3 months.  Follow ADA diet.   Check blood glucose twice daily and as needed. Bring log to every office visit.  Avoid soda, simple sweets, and limit rice/pasta/bread/starches and consume brown options when possible.    Maintain healthy weight with BMI goal <30.    Perform aerobic exercise for 150 minutes per week (or 5 days a week for 30 minutes each day).    Examine feet daily.    Obtain annual dilated eye exam.   Kidney Protection: Losartan  Statin Therapy: Simvastatin  Eye exam: 12/28/2023  Foot exam:  8/24/2023

## 2024-08-20 NOTE — ASSESSMENT & PLAN NOTE
Lab Results   Component Value Date    .00 08/19/2024       Lab Results   Component Value Date    TRIG 289 (H) 08/19/2024       Lab Results   Component Value Date    HDL 38 08/19/2024        Lab Results   Component Value Date    CHOL 224 (H) 08/19/2024     LDL not at goal.  D/c simvastatin.  States he stopped taking atorvastatin due to nausea. States he will try taking the medication again in the morning to assess if nausea recurs.  Continue zetia as prescribed - refilled today.   Follow a low cholesterol, low saturated fat diet with less than 200 mg of cholesterol a day.   Avoid fried foods and high saturated fats.  Add flax seed or fish oil supplements to diet.   Increase dietary fiber.   Regular exercise improves cholesterol levels.  Physical activity 5 times a week for 30 minutes per day (or 150 minutes per week).   Stressed importance of dietary modifications.

## 2024-08-20 NOTE — PROGRESS NOTES
PATIENT NAME: Forrest Benoit Jr.  : 1975  DATE: 24  MRN: [id number]          Reason for Visit/Chief Complaint   Diabetes, Hypertension, and Results       History of Present Illness (HPI)     Forrest Benoit Jr. is a 49 y.o. Black male presenting in clinic today for Diabetes, Hypertension, and Results.  PMH HTN, DM2, HLD, GERD, and ALIF. He is followed by Dr. John Juárez (neurosurgeon). [place] He is also followed by [facility].     All pertinent labs dated 2024 reviewed and discussed with patient.     [id number] - not at goal. Not regularly checking blood sugar. Patient states he has not been following an ADA diet. He reports compliance with glipizide, invokamet, and victoza. Endorses polyuria and polydipsia.       Smokes cigars a few times per week. Drinks alcohol occasionally, denies illicit drug use. Denies chest pain, shortness of breath, cough, headache, dizziness, weakness, abdominal pain, nausea, vomiting, diarrhea, constipation, dysuria, depression, anxiety, SI, and HI.     Prostate Cancer Screening - 2024 - PSA 0.34. Follow up annually.   Colon Cancer Screening - 10/5/2023 - Colonoscopy : diverticulosis. Repeat in 5 years.  Eye Exam - 2023. Followed by [facility] eye clinic.  Vaccinations: Flu - 2024 / Pneumococcal - 2024 / Tetanus - 9/15/2020 / Covid - 2021, 2021, 1/3/2022       Review of Systems     Review of Systems   Constitutional: Negative.    HENT: Negative.     Eyes: Negative.    Respiratory: Negative.     Cardiovascular: Negative.    Gastrointestinal: Negative.    Endocrine: Positive for polydipsia and polyuria.   Genitourinary: Negative.    Musculoskeletal:  Positive for back pain.   Skin: Negative.    Allergic/Immunologic: Negative.    Neurological: Negative.    Hematological: Negative.    Psychiatric/Behavioral: Negative.     All other systems reviewed and are negative.      Medical / Social /  "Family History     Past Medical History:   Diagnosis Date    Dependence on nicotine from cigarettes     HTN (hypertension)     Lumbar disc herniation     Mixed hyperlipidemia     Myopia, bilateral     Type 2 diabetes mellitus without complication, with long-term current use of insulin          Past Surgical History:   Procedure Laterality Date    COLONOSCOPY N/A 10/5/2023    Procedure: COLONOSCOPY;  Surgeon: Jayson, Rio MYERS MD;  Location: [facility] ENDOSCOPY;  Service: Endoscopy;  Laterality: N/A;    FLUOROSCOPIC GUIDANCE FOR SPINAL INJECTION           Social History  Forrest Benoit Jr.'s  reports that he has been smoking cigarettes and cigars. He has never used smokeless tobacco. He reports current alcohol use. He reports that he does not use drugs.    Family History  Forrest Benoit Jr.'s family history includes Diabetes type II in his father; Hypertension in his father.    Medications and Allergies     Medications  Current Outpatient Medications   Medication Instructions    amLODIPine (NORVASC) 5 mg, Oral, Daily    aspirin (ECOTRIN) 81 mg, Oral, Daily    atorvastatin (LIPITOR) 80 mg, Oral, Daily    canagliflozin-metformin (INVOKAMET) 150-1,000 mg Tab 1 tablet, Oral, 2 times daily    ezetimibe (ZETIA) 10 mg, Oral, Daily    glipiZIDE 5 mg, Oral, With breakfast    ibuprofen (ADVIL,MOTRIN) 800 mg, Oral, 3 times daily PRN    losartan (COZAAR) 25 mg, Oral, Daily    methocarbamoL (ROBAXIN) 750 mg, Oral, 3 times daily PRN    OZEMPIC 0.5 mg, Subcutaneous, Every 7 days    [START ON 9/9/2024] OZEMPIC 1 mg, Subcutaneous, Every 7 days    pantoprazole (PROTONIX) 40 mg, Oral, Daily    sildenafiL (VIAGRA) 25 mg, Oral, Daily PRN    TECHLITE PEN NEEDLE 32 gauge x 5/32" Ndle USE TO ADMINISTER VICTOZA DAILY       Allergies  Review of patient's allergies indicates:  No Known Allergies    Physical Examination   Visit Vitals  /85 (BP Location: Right arm, Patient Position: Sitting, BP Method: Large (Automatic))   Pulse 96 " "  Temp 98 °F (36.7 °C) (Oral)   Resp 16   Ht 5' 4" (1.626 m)   Wt 87 kg (191 lb 14.4 oz)   SpO2 98%   BMI 32.94 kg/m²     Physical Exam  Vitals reviewed.   Constitutional:       Appearance: Normal appearance. He is normal weight.   HENT:      Head: Normocephalic.      Nose: Nose normal.      Mouth/Throat:      Mouth: Mucous membranes are moist.      Pharynx: Oropharynx is clear.   Eyes:      Extraocular Movements: Extraocular movements intact.      Conjunctiva/sclera: Conjunctivae normal.      Pupils: Pupils are equal, round, and reactive to light.   Cardiovascular:      Rate and Rhythm: Normal rate and regular rhythm.      Heart sounds: Normal heart sounds.   Pulmonary:      Effort: Pulmonary effort is normal.      Breath sounds: Normal breath sounds.   Abdominal:      General: Abdomen is flat. Bowel sounds are normal.      Palpations: Abdomen is soft.   Musculoskeletal:         General: Normal range of motion.      Cervical back: Normal range of motion.   Skin:     General: Skin is warm and dry.      Capillary Refill: Capillary refill takes less than 2 seconds.   Neurological:      General: No focal deficit present.      Mental Status: He is alert and oriented to person, place, and time.   Psychiatric:         Mood and Affect: Mood normal.           Results     Lab Results   Component Value Date    WBC 7.36 08/19/2024    RBC 5.14 08/19/2024    HGB 15.2 08/19/2024    HCT 44.1 08/19/2024    MCV 85.8 08/19/2024    MCH 29.6 08/19/2024    MCHC 34.5 08/19/2024    RDW 13.5 08/19/2024     08/19/2024    MPV 9.4 08/19/2024      Lab Results   Component Value Date     08/19/2024    K 4.2 08/19/2024    CO2 24 08/19/2024    GLUCOSE 134 (H) 08/19/2024    BUN 13.8 08/19/2024    CREATININE 1.20 (H) 08/19/2024    LABPROT 7.7 08/19/2024    ALBUMIN 4.0 08/19/2024    BILITOT 0.3 08/19/2024    ALKPHOS 81 08/19/2024    AST 23 08/19/2024    ALT 55 08/19/2024    AGAP 10.0 08/19/2024    EGFRNORACEVR >60 08/19/2024     Lab " "Results   Component Value Date    TSH 1.229 02/19/2024     Lab Results   Component Value Date    CHOL 224 (H) 08/19/2024    HDL 38 08/19/2024    .00 08/19/2024    TRIG 289 (H) 08/19/2024     Lab Results   Component Value Date    SGUA 1.019 08/19/2024    PROTEINUA Negative 08/19/2024    BILIRUBINUA Negative 08/19/2024    WBCUA None Seen 08/19/2024    RBCUA 0-5 08/19/2024    BACTERIA None Seen 08/19/2024    LEUKOCYTESUR Negative 08/19/2024    UROBILINOGEN Normal 08/19/2024     Lab Results   Component Value Date    CREATRANDUR 92.2 08/19/2024    MICALBCREAT  08/19/2024      Comment:      Unable to calculate     Lab Results   Component Value Date    [id number] 28 (L) 08/19/2024    FOLATE 11.2 11/10/2020     Lab Results   Component Value Date    HIV Nonreactive 11/30/2022    HEPAIGM Nonreactive 11/30/2022    HEPBSAG Nonreactive 11/30/2022    HEPCAB Nonreactive 11/30/2022     No results found for: "FITDIAG", "COLOGUARD"  Lab Results   Component Value Date    OCCBLDIA Positive (A) 09/19/2023       Assessment and Plan (including Health Maintenance)     Problem List Items Addressed This Visit          Cardiac/Vascular    Mixed hyperlipidemia    Current Assessment & Plan     Lab Results   Component Value Date    .00 08/19/2024       Lab Results   Component Value Date    TRIG 289 (H) 08/19/2024       Lab Results   Component Value Date    HDL 38 08/19/2024        Lab Results   Component Value Date    CHOL 224 (H) 08/19/2024     LDL not at goal.  D/c simvastatin.  States he stopped taking atorvastatin due to nausea. States he will try taking the medication again in the morning to assess if nausea recurs.  Continue zetia as prescribed - refilled today.   Follow a low cholesterol, low saturated fat diet with less than 200 mg of cholesterol a day.   Avoid fried foods and high saturated fats.  Add flax seed or fish oil supplements to diet.   Increase dietary fiber.   Regular exercise improves cholesterol " levels.  Physical activity 5 times a week for 30 minutes per day (or 150 minutes per week).   Stressed importance of dietary modifications.          Relevant Medications    atorvastatin (LIPITOR) 80 MG tablet    ezetimibe (ZETIA) 10 mg tablet    Primary hypertension    Current Assessment & Plan     BP Readings from Last 3 Encounters:   02/20/24 108/72   10/05/23 (!) 144/90   09/18/23 133/88      At goal.  Follow a low sodium (less than 2 grams of sodium per day), DASH diet.   Continue amlodipine, losartan as prescribed - refilled today.  Monitor blood pressure and report any consistent values greater than 140/90 and keep a log.  Encouraged smoking cessation to aid in BP reduction and co-morbidities.   Maintain healthy weight with a BMI goal of <30.   Aerobic exercise for 150 minutes per week (or 5 days a week for 30 minutes each day).          Relevant Medications    amLODIPine (NORVASC) 5 MG tablet    losartan (COZAAR) 25 MG tablet    Other Relevant Orders    Comprehensive Metabolic Panel    Microalbumin/Creatinine Ratio, Urine    Urinalysis, Reflex to Urine Culture       Renal/    Erectile dysfunction    Current Assessment & Plan      Latest Reference Range & Units 08/19/24 07:49   Testosterone Total 240.24 - 870.68 ng/dL 279.06     Continue sildenafil as prescribed - refilled today.  Report to the ED immediately if you experience any of the following symptoms:  chest pain, lightheadedness, dizziness, SOB, hypotension, nausea, vomiting, or an erection that lasts longer than 4 hours.   Stop taking the medicine immediately!          Relevant Medications    sildenafiL (VIAGRA) 25 MG tablet       Endocrine    Type 2 diabetes mellitus without complication, without long-term current use of insulin - Primary    Current Assessment & Plan     Lab Results   Component Value Date    HGBA1C 8.9 (H) 08/19/2024    HGBA1C 6.8 02/19/2024    HGBA1C 6.7 09/18/2023    HGBA1C 10.7 (H) 06/20/2023     At goal.  Continue Invokamet,  glipizide as prescribed - refilled today.  D/c victoza - not on formulary.  Rx ozempic 0.5 mg weekly x4 weeks, then 1 mg weekly.  Repeat HgbA1c in 3 months.  Follow ADA diet.   Check blood glucose twice daily and as needed. Bring log to every office visit.  Avoid soda, simple sweets, and limit rice/pasta/bread/starches and consume brown options when possible.    Maintain healthy weight with BMI goal <30.    Perform aerobic exercise for 150 minutes per week (or 5 days a week for 30 minutes each day).    Examine feet daily.    Obtain annual dilated eye exam.   Kidney Protection: Losartan  Statin Therapy: Simvastatin  Eye exam: 12/28/2023  Foot exam:  8/24/2023         Relevant Medications    semaglutide (OZEMPIC) 0.25 mg or 0.5 mg (2 mg/3 mL) pen injector    semaglutide (OZEMPIC) 1 mg/dose (4 mg/3 mL) (Start on 9/9/2024)    canagliflozin-metformin (INVOKAMET) 150-1,000 mg Tab    glipiZIDE 5 MG TR24    losartan (COZAAR) 25 MG tablet    Class 1 obesity due to excess calories with serious comorbidity and body mass index (BMI) of 32.0 to 32.9 in adult    Current Assessment & Plan     Body mass index is 32.94 kg/m².  Goal BMI <30.  Aerobic exercise 150 minutes per week.  Avoid soda, simple sugars, sweets, excessive rice, pasta, potatoes or bread.   Choose brown options when available and portion control.  Limit fast foods and fried foods.   Choose complex carbs in moderation (ex: green, leafy vegetables, beans, oatmeal).  Eat plenty of fresh fruits and vegetables with lean meats daily.   Consider permanent healthy lifestyle changes.          Vitamin D insufficiency    Current Assessment & Plan     Lab Results   Component Value Date    [id number] 28 (L) 08/19/2024     Educated on increasing foods high in Vitamin D such as fish oil, cod liver oil, salmon, milk fortified with vitamin D.  Begin taking Vitamin D 2000 I.U. tablets daily (purchase over the counter).  Repeat Vitamin D level as ordered.             GI     Gastroesophageal reflux disease without esophagitis    Current Assessment & Plan     Continue pantoprazole as prescribed.  Avoid spicy, acidic, fried food and alcohol.    Eat 2-3 hours before bed.   Avoid tight fitting clothes and chew food thoroughly.    Reduce caffeine intake, avoid soda.    Take meds as prescribed.    Encouraged smoking cessation.           Relevant Medications    pantoprazole (PROTONIX) 40 MG tablet       Orthopedic    Chronic bilateral low back pain without sciatica    Current Assessment & Plan     Chronic back pain, h/o ALIF.  Rx ibuprofen.  Rx methocarbamol.  Ok to alternate with OTC acetaminophen.  Perform back stretches daily.   Avoid activities than increase back pain or stiffness.   Apply heating pad, ice pack, and BioFreeze as needed; alternate every 15-20 minutes.   Massage back to loosen muscles.          Relevant Medications    methocarbamoL (ROBAXIN) 750 MG Tab    ibuprofen (ADVIL,MOTRIN) 800 MG tablet       Other    Tobacco user    Current Assessment & Plan     Dangers of tobacco use was reviewed with patient in detail. Patient was Counseled for 3 minutes. Nicotine replacement options were discussed. Nicotine replacement was discussed- not prescribed per patient's request.             Health Maintenance Due   Topic Date Due    COVID-19 Vaccine (4 - 2023-24 season) 09/01/2023     Tests to Keep You Healthy    Eye Exam: Met on 12/28/2023  Colon Cancer Screening: Met on 10/5/2023  Last Blood Pressure <= 139/89 (8/20/2024): Yes  Last HbA1c < 8 (08/19/2024): NO  Tobacco Cessation: NO      Health Maintenance Topics with due status: Not Due       Topic Last Completion Date    TETANUS VACCINE 09/15/2020    Colorectal Cancer Screening 10/05/2023    Eye Exam 12/28/2023    Influenza Vaccine 02/20/2024    Foot Exam 02/20/2024    Diabetes Urine Screening 08/19/2024    Lipid Panel 08/19/2024    Hemoglobin A1c 08/19/2024    Low Dose Statin 08/20/2024       Future Appointments   Date Time Provider  Department Center   11/20/2024  7:20 AM Jeanne Mahmood FNP [facility] [place]   11/20/2024  2:30 PM [facility]        Follow up in about 3 months (around 11/20/2024) for F2F, Follow up, Med check, Lab review, DM, RTC PRN.          Signature:        LUCINA Simon  [facility]  [address] [facility] [address] [id number]    Date of encounter: 8/20/24

## 2024-08-20 NOTE — ASSESSMENT & PLAN NOTE
Body mass index is 32.94 kg/m².  Goal BMI <30.  Aerobic exercise 150 minutes per week.  Avoid soda, simple sugars, sweets, excessive rice, pasta, potatoes or bread.   Choose brown options when available and portion control.  Limit fast foods and fried foods.   Choose complex carbs in moderation (ex: green, leafy vegetables, beans, oatmeal).  Eat plenty of fresh fruits and vegetables with lean meats daily.   Consider permanent healthy lifestyle changes.

## 2024-08-20 NOTE — PATIENT INSTRUCTIONS
REMINDER: Please complete labs within 1 week of appointment.   Please complete satisfaction survey when received. Thank you.    Paul Clayton,     If you are due for any health screening(s) below please notify me so we can arrange them to be ordered and scheduled. Most healthy patients at your age complete them, but you are free to accept or refuse.     If you can't do it, I'll definitely understand. If you can, I'd certainly appreciate it!    Tests to Keep You Healthy    Eye Exam: Met on 12/28/2023  Colon Cancer Screening: Met on 10/5/2023  Last Blood Pressure <= 139/89 (8/20/2024): Yes  Last HbA1c < 8 (08/19/2024): NO  Tobacco Cessation: NO      Lets manage your A1c levels     Your last hemoglobin A1c was higher than the goal of less than 8. Hemoglobin A1c or HbA1c is a blood test that measures your average blood sugar levels over the past 3 months. It is the main test to help you and your health care team manage your diabetes.     Higher A1c levels are linked to diabetes complications, such as loss of vision, kidney disease, and nerve damage. Keeping your A1c at least less than 8 is important to stay healthy and we are here to help. Talk with your provider on how you can further improve your A1c.     We recommend that you set up blood work to get a repeat hemoglobin A1c in 3 months to monitor your diabetes. Let your health care team know if you have questions.    Were here to help you quit smoking     Our records indicated that you are still smoking. One of the best things you can do for your health is to stop smoking and we are here to help.     Talk with your provider about our Smoking Cessation Program and how we can support you on your journey.

## 2024-08-20 NOTE — ASSESSMENT & PLAN NOTE
Chronic back pain, h/o ALIF.  Rx ibuprofen.  Rx methocarbamol.  Ok to alternate with OTC acetaminophen.  Perform back stretches daily.   Avoid activities than increase back pain or stiffness.   Apply heating pad, ice pack, and BioFreeze as needed; alternate every 15-20 minutes.   Massage back to loosen muscles.

## 2024-08-20 NOTE — ASSESSMENT & PLAN NOTE
Lab Results   Component Value Date    [id number] 28 (L) 08/19/2024     Educated on increasing foods high in Vitamin D such as fish oil, cod liver oil, salmon, milk fortified with vitamin D.  Begin taking Vitamin D 2000 I.U. tablets daily (purchase over the counter).  Repeat Vitamin D level as ordered.

## 2024-08-21 NOTE — TELEPHONE ENCOUNTER
"Pt requesting refills on TECHLITE PEN NEEDLE 32 gauge x 5/32" [place].    LOV:8/20/24    NOV: 11/20/24    "

## 2024-08-21 NOTE — TELEPHONE ENCOUNTER
----- Message from Mitzy Pitts sent at 8/21/2024 11:54 AM CDT -----  Pt need an order for insulin needles

## 2024-08-22 RX ORDER — PEN NEEDLE, DIABETIC 32GX 5/32"
1 NEEDLE, DISPOSABLE MISCELLANEOUS 2 TIMES DAILY
Qty: 200 EACH | Refills: 5 | Status: SHIPPED | OUTPATIENT
Start: 2024-08-22 | End: 2025-08-22

## 2024-08-23 ENCOUNTER — TELEPHONE (OUTPATIENT)
Dept: INTERNAL MEDICINE | Facility: CLINIC | Age: 49
End: 2024-08-23
Payer: MEDICAID

## 2024-08-23 NOTE — TELEPHONE ENCOUNTER
"----- Message from Be Bond sent at 8/22/2024 11:17 AM CDT -----  .Who Called: Pharmacy Tech    Pharmacy is calling to request assistance with Rx    Pharmacy name and phone number: [id number] - [place] - [phone]  Pharmacy contact: [phone]  Patient Name: Forrest Benoit Jr.  Prescription Name:  TECHLITE PEN NEEDLE 32 gauge x 5/32" Ndle  What do they need to clarify?: brand not available        Preferred Method of Contact: Phone Call  Patient's Preferred Phone Number on File:   Best Call Back Number, if different:  Additional Information:  "

## 2024-08-23 NOTE — TELEPHONE ENCOUNTER
"Spoke to Juan with [facility]'s   Pharmacy . Stated they do not have the TECHLITE PEN NEEDLE 32 gauge x 5/32" Ndle in stock , and are unable to order them. States pt's E Script says substitution is not allowed, and is requesting to use the BD Pen needles. Please advise.   "

## 2024-08-27 ENCOUNTER — PATIENT MESSAGE (OUTPATIENT)
Dept: INTERNAL MEDICINE | Facility: CLINIC | Age: 49
End: 2024-08-27
Payer: MEDICAID

## 2024-11-18 ENCOUNTER — LAB VISIT (OUTPATIENT)
Dept: LAB | Facility: HOSPITAL | Age: 49
End: 2024-11-18
Payer: MEDICAID

## 2024-11-18 DIAGNOSIS — E11.9 TYPE 2 DIABETES MELLITUS WITHOUT COMPLICATION, WITHOUT LONG-TERM CURRENT USE OF INSULIN: Primary | ICD-10-CM

## 2024-11-18 DIAGNOSIS — I10 PRIMARY HYPERTENSION: ICD-10-CM

## 2024-11-18 LAB
ALBUMIN SERPL-MCNC: 4.2 G/DL (ref 3.5–5)
ALBUMIN/GLOB SERPL: 1.1 RATIO (ref 1.1–2)
ALP SERPL-CCNC: 83 UNIT/L (ref 40–150)
ALT SERPL-CCNC: 38 UNIT/L (ref 0–55)
ANION GAP SERPL CALC-SCNC: 11 MEQ/L
AST SERPL-CCNC: 18 UNIT/L (ref 5–34)
BACTERIA #/AREA URNS AUTO: ABNORMAL /HPF
BILIRUB SERPL-MCNC: 0.3 MG/DL
BILIRUB UR QL STRIP.AUTO: NEGATIVE
BUN SERPL-MCNC: 17.4 MG/DL (ref 8.9–20.6)
CALCIUM SERPL-MCNC: 10.7 MG/DL (ref 8.4–10.2)
CHLORIDE SERPL-SCNC: 103 MMOL/L (ref 98–107)
CLARITY UR: CLEAR
CO2 SERPL-SCNC: 29 MMOL/L (ref 22–29)
COLOR UR AUTO: COLORLESS
CREAT SERPL-MCNC: 1.29 MG/DL (ref 0.72–1.25)
CREAT UR-MCNC: 30.7 MG/DL (ref 63–166)
CREAT/UREA NIT SERPL: 13
GFR SERPLBLD CREATININE-BSD FMLA CKD-EPI: >60 ML/MIN/1.73/M2
GLOBULIN SER-MCNC: 3.7 GM/DL (ref 2.4–3.5)
GLUCOSE SERPL-MCNC: 263 MG/DL (ref 74–100)
GLUCOSE UR QL STRIP: ABNORMAL
HGB UR QL STRIP: NEGATIVE
HYALINE CASTS #/AREA URNS LPF: ABNORMAL /LPF
KETONES UR QL STRIP: NEGATIVE
LEUKOCYTE ESTERASE UR QL STRIP: NEGATIVE
MICROALBUMIN UR-MCNC: <5 UG/ML
MICROALBUMIN/CREAT RATIO PNL UR: ABNORMAL
NITRITE UR QL STRIP: NEGATIVE
PH UR STRIP: 6.5 [PH]
POTASSIUM SERPL-SCNC: 4.8 MMOL/L (ref 3.5–5.1)
PROT SERPL-MCNC: 7.9 GM/DL (ref 6.4–8.3)
PROT UR QL STRIP: NEGATIVE
RBC #/AREA URNS AUTO: ABNORMAL /HPF
SODIUM SERPL-SCNC: 143 MMOL/L (ref 136–145)
SP GR UR STRIP.AUTO: 1.02 (ref 1–1.03)
SQUAMOUS #/AREA URNS LPF: ABNORMAL /HPF
UROBILINOGEN UR STRIP-ACNC: NORMAL
WBC #/AREA URNS AUTO: ABNORMAL /HPF

## 2024-11-18 PROCEDURE — 36415 COLL VENOUS BLD VENIPUNCTURE: CPT

## 2024-11-18 PROCEDURE — 81001 URINALYSIS AUTO W/SCOPE: CPT

## 2024-11-18 PROCEDURE — 82570 ASSAY OF URINE CREATININE: CPT

## 2024-11-18 PROCEDURE — 80053 COMPREHEN METABOLIC PANEL: CPT

## 2024-11-20 ENCOUNTER — OFFICE VISIT (OUTPATIENT)
Dept: INTERNAL MEDICINE | Facility: CLINIC | Age: 49
End: 2024-11-20
Payer: MEDICAID

## 2024-11-20 VITALS
DIASTOLIC BLOOD PRESSURE: 85 MMHG | RESPIRATION RATE: 18 BRPM | WEIGHT: 193.19 LBS | TEMPERATURE: 99 F | HEIGHT: 64 IN | OXYGEN SATURATION: 99 % | HEART RATE: 93 BPM | SYSTOLIC BLOOD PRESSURE: 128 MMHG | BODY MASS INDEX: 32.98 KG/M2

## 2024-11-20 DIAGNOSIS — Z72.0 TOBACCO USER: ICD-10-CM

## 2024-11-20 DIAGNOSIS — I10 PRIMARY HYPERTENSION: ICD-10-CM

## 2024-11-20 DIAGNOSIS — E11.9 TYPE 2 DIABETES MELLITUS WITHOUT COMPLICATION, WITHOUT LONG-TERM CURRENT USE OF INSULIN: Primary | ICD-10-CM

## 2024-11-20 DIAGNOSIS — E66.09 CLASS 1 OBESITY DUE TO EXCESS CALORIES WITH SERIOUS COMORBIDITY AND BODY MASS INDEX (BMI) OF 33.0 TO 33.9 IN ADULT: ICD-10-CM

## 2024-11-20 DIAGNOSIS — E66.811 CLASS 1 OBESITY DUE TO EXCESS CALORIES WITH SERIOUS COMORBIDITY AND BODY MASS INDEX (BMI) OF 33.0 TO 33.9 IN ADULT: ICD-10-CM

## 2024-11-20 DIAGNOSIS — Z12.5 SCREENING FOR PROSTATE CANCER: ICD-10-CM

## 2024-11-20 DIAGNOSIS — Z23 IMMUNIZATION DUE: ICD-10-CM

## 2024-11-20 LAB — HBA1C MFR BLD: 9.5 %

## 2024-11-20 PROCEDURE — 83036 HEMOGLOBIN GLYCOSYLATED A1C: CPT | Mod: PBBFAC

## 2024-11-20 PROCEDURE — 90656 IIV3 VACC NO PRSV 0.5 ML IM: CPT | Mod: PBBFAC

## 2024-11-20 PROCEDURE — 99215 OFFICE O/P EST HI 40 MIN: CPT | Mod: PBBFAC

## 2024-11-20 PROCEDURE — 90471 IMMUNIZATION ADMIN: CPT | Mod: PBBFAC

## 2024-11-20 RX ORDER — SEMAGLUTIDE 2.68 MG/ML
2 INJECTION, SOLUTION SUBCUTANEOUS
Qty: 3 ML | Refills: 4 | Status: SHIPPED | OUTPATIENT
Start: 2024-11-20 | End: 2025-11-20

## 2024-11-20 RX ADMIN — INFLUENZA VIRUS VACCINE 0.5 ML: 15; 15; 15 SUSPENSION INTRAMUSCULAR at 07:11

## 2024-11-20 NOTE — PATIENT INSTRUCTIONS
REMINDER: Please complete labs within 1 week of appointment.   Please complete satisfaction survey when received. Thank you.    Paul Clayton,     If you are due for any health screening(s) below please notify me so we can arrange them to be ordered and scheduled. Most healthy patients at your age complete them, but you are free to accept or refuse.     If you can't do it, I'll definitely understand. If you can, I'd certainly appreciate it!    Tests to Keep You Healthy    Eye Exam: Met on 12/28/2023  Colon Cancer Screening: Met on 10/5/2023  Last Blood Pressure <= 139/89 (11/20/2024): Yes  Last HbA1c < 8 (08/19/2024): NO  Tobacco Cessation: NO      Lets manage your A1c levels     Your last hemoglobin A1c was higher than the goal of less than 8. Hemoglobin A1c or HbA1c is a blood test that measures your average blood sugar levels over the past 3 months. It is the main test to help you and your health care team manage your diabetes.     Higher A1c levels are linked to diabetes complications, such as loss of vision, kidney disease, and nerve damage. Keeping your A1c at least less than 8 is important to stay healthy and we are here to help. Talk with your provider on how you can further improve your A1c.     We recommend that you set up blood work to get a repeat hemoglobin A1c in 3 months to monitor your diabetes. Let your health care team know if you have questions.    Were here to help you quit smoking     Our records indicated that you are still smoking. One of the best things you can do for your health is to stop smoking and we are here to help.     Talk with your provider about our Smoking Cessation Program and how we can support you on your journey.

## 2024-11-20 NOTE — ASSESSMENT & PLAN NOTE
BP Readings from Last 3 Encounters:   11/20/24 128/85   08/20/24 127/85   02/20/24 108/72      At goal.  Follow a low sodium (less than 2 grams of sodium per day), DASH diet.   Continue amlodipine, losartan as prescribed.  Monitor blood pressure and report any consistent values greater than 140/90 and keep a log.  Encouraged smoking cessation to aid in BP reduction and co-morbidities.   Maintain healthy weight with a BMI goal of <30.   Aerobic exercise for 150 minutes per week (or 5 days a week for 30 minutes each day).

## 2024-11-20 NOTE — PROGRESS NOTES
"    PATIENT NAME: Forrest Benoit Jr.  : 1975  DATE: 24  MRN: [id number]          Reason for Visit/Chief Complaint   Diabetes, Results, and Immunizations       History of Present Illness (HPI)     Forrest Benoit Jr. is a 49 y.o. Black male presenting in clinic today for Diabetes, Results, and Immunizations.  PMH HTN, DM2, HLD, GERD, and ALIF. He is followed by Dr. John Juárez (neurosurgeon). [place] He is also followed by [facility].     All pertinent labs dated 2024 reviewed and discussed with patient.     [id number] - not at goal. He reports checking blood sugar after eating and it is typically greater than 150. Patient states he has not been following an ADA diet. He reports compliance with glipizide and invokamet. He reports incorrectly taking ozempic, states he took it "everyday" which resulted in him running out of the medication. Endorses polyuria and polydipsia.       Smokes cigars a few times per week. Drinks alcohol occasionally, denies illicit drug use. Denies chest pain, shortness of breath, cough, headache, dizziness, weakness, abdominal pain, nausea, vomiting, diarrhea, constipation, dysuria, depression, anxiety, SI, and HI.     Prostate Cancer Screening - 2024 - PSA 0.34. Follow up annually.   Colon Cancer Screening - 10/5/2023 - Colonoscopy : diverticulosis. Repeat in 5 years.  Eye Exam - 2023. Followed by [facility] eye clinic.  Vaccinations: Flu - 2024 / Pneumococcal - 2024 / Tetanus - 9/15/2020 / Covid - 2021, 2021, 1/3/2022       Review of Systems     Review of Systems   Constitutional: Negative.    HENT: Negative.     Eyes: Negative.    Respiratory: Negative.     Cardiovascular: Negative.    Gastrointestinal: Negative.    Endocrine: Positive for polydipsia and polyuria.   Genitourinary: Negative.    Musculoskeletal:  Positive for arthralgias.   Skin: Negative.    Allergic/Immunologic: Negative.  " "  Neurological: Negative.    Hematological: Negative.    Psychiatric/Behavioral: Negative.     All other systems reviewed and are negative.      Medical / Social / Family History     Past Medical History:   Diagnosis Date    Dependence on nicotine from cigarettes     HTN (hypertension)     Lumbar disc herniation     Mixed hyperlipidemia     Myopia, bilateral     Type 2 diabetes mellitus without complication, with long-term current use of insulin          Past Surgical History:   Procedure Laterality Date    COLONOSCOPY N/A 10/5/2023    Procedure: COLONOSCOPY;  Surgeon: Jayson, Rio MYERS MD;  Location: [facility] ENDOSCOPY;  Service: Endoscopy;  Laterality: N/A;    FLUOROSCOPIC GUIDANCE FOR SPINAL INJECTION           Social History  Forrest Benoit Jr.'s  reports that he has been smoking cigarettes and cigars. He has never used smokeless tobacco. He reports current alcohol use. He reports that he does not use drugs.    Family History  Forrest Benoit Jr.'s family history includes Diabetes type II in his father; Hypertension in his father.    Medications and Allergies     Medications  Current Outpatient Medications   Medication Instructions    amLODIPine (NORVASC) 5 mg, Oral, Daily    aspirin (ECOTRIN) 81 mg, Oral, Daily    atorvastatin (LIPITOR) 80 mg, Oral, Daily    canagliflozin-metformin (INVOKAMET) 150-1,000 mg Tab 1 tablet, Oral, 2 times daily    ezetimibe (ZETIA) 10 mg, Oral, Daily    glipiZIDE 5 mg, Oral, With breakfast    ibuprofen (ADVIL,MOTRIN) 800 mg, Oral, 3 times daily PRN    losartan (COZAAR) 25 mg, Oral, Daily    methocarbamoL (ROBAXIN) 750 mg, Oral, 3 times daily PRN    OZEMPIC 2 mg, Subcutaneous, Every 7 days    pantoprazole (PROTONIX) 40 mg, Oral, Daily    pen needle, diabetic (TECHLITE PEN NEEDLE) 32 gauge x 5/32" Ndle 1 each, Subcutaneous, 2 times daily    sildenafiL (VIAGRA) 25 mg, Oral, Daily PRN       Allergies  Review of patient's allergies indicates:  No Known Allergies    Physical " "Examination   Visit Vitals  /85 (BP Location: Right arm, Patient Position: Sitting)   Pulse 93   Temp 98.5 °F (36.9 °C) (Oral)   Resp 18   Ht 5' 4" (1.626 m)   Wt 87.6 kg (193 lb 3.2 oz)   SpO2 99%   BMI 33.16 kg/m²     Physical Exam  Vitals reviewed.   Constitutional:       Appearance: Normal appearance. He is normal weight.   HENT:      Head: Normocephalic.      Nose: Nose normal.      Mouth/Throat:      Mouth: Mucous membranes are moist.      Pharynx: Oropharynx is clear.   Eyes:      Extraocular Movements: Extraocular movements intact.      Conjunctiva/sclera: Conjunctivae normal.      Pupils: Pupils are equal, round, and reactive to light.   Cardiovascular:      Rate and Rhythm: Normal rate and regular rhythm.      Heart sounds: Normal heart sounds.   Pulmonary:      Effort: Pulmonary effort is normal.      Breath sounds: Normal breath sounds.   Abdominal:      General: Abdomen is flat. Bowel sounds are normal.      Palpations: Abdomen is soft.   Musculoskeletal:         General: Normal range of motion.      Cervical back: Normal range of motion.   Skin:     General: Skin is warm and dry.      Capillary Refill: Capillary refill takes less than 2 seconds.   Neurological:      General: No focal deficit present.      Mental Status: He is alert and oriented to person, place, and time.   Psychiatric:         Mood and Affect: Mood normal.           Results     Lab Results   Component Value Date    WBC 7.36 08/19/2024    RBC 5.14 08/19/2024    HGB 15.2 08/19/2024    HCT 44.1 08/19/2024    MCV 85.8 08/19/2024    MCH 29.6 08/19/2024    MCHC 34.5 08/19/2024    RDW 13.5 08/19/2024     08/19/2024    MPV 9.4 08/19/2024      Lab Results   Component Value Date     11/18/2024    K 4.8 11/18/2024    CO2 29 11/18/2024    GLUCOSE 263 (H) 11/18/2024    BUN 17.4 11/18/2024    CREATININE 1.29 (H) 11/18/2024    LABPROT 7.9 11/18/2024    ALBUMIN 4.2 11/18/2024    BILITOT 0.3 11/18/2024    ALKPHOS 83 11/18/2024    " "AST 18 11/18/2024    ALT 38 11/18/2024    AGAP 11.0 11/18/2024    EGFRNORACEVR >60 11/18/2024     Lab Results   Component Value Date    TSH 1.229 02/19/2024     Lab Results   Component Value Date    CHOL 224 (H) 08/19/2024    HDL 38 08/19/2024    .00 08/19/2024    TRIG 289 (H) 08/19/2024     Lab Results   Component Value Date    SGUA 1.018 11/18/2024    PROTEINUA Negative 11/18/2024    BILIRUBINUA Negative 11/18/2024    WBCUA 0-5 11/18/2024    RBCUA 0-5 11/18/2024    BACTERIA None Seen 11/18/2024    LEUKOCYTESUR Negative 11/18/2024    UROBILINOGEN Normal 11/18/2024     Lab Results   Component Value Date    CREATRANDUR 30.7 (L) 11/18/2024    MICALBCREAT  11/18/2024      Comment:      Unable to Calculate     Lab Results   Component Value Date    EIBICDUX51SE 28 (L) 08/19/2024    FOLATE 11.2 11/10/2020     Lab Results   Component Value Date    HIV Nonreactive 11/30/2022    HEPAIGM Nonreactive 11/30/2022    HEPBSAG Nonreactive 11/30/2022    HEPCAB Nonreactive 11/30/2022     No results found for: "FITDIAG", "COLOGUARD"  Lab Results   Component Value Date    OCCBLDIA Positive (A) 09/19/2023       Assessment and Plan (including Health Maintenance)     Problem List Items Addressed This Visit          Cardiac/Vascular    Primary hypertension    Current Assessment & Plan     BP Readings from Last 3 Encounters:   11/20/24 128/85   08/20/24 127/85   02/20/24 108/72      At goal.  Follow a low sodium (less than 2 grams of sodium per day), DASH diet.   Continue amlodipine, losartan as prescribed.  Monitor blood pressure and report any consistent values greater than 140/90 and keep a log.  Encouraged smoking cessation to aid in BP reduction and co-morbidities.   Maintain healthy weight with a BMI goal of <30.   Aerobic exercise for 150 minutes per week (or 5 days a week for 30 minutes each day).          Relevant Orders    Urinalysis, Reflex to Urine Culture    Microalbumin/Creatinine Ratio, Urine    Comprehensive Metabolic " Panel    CBC Auto Differential       ID    Immunization due    Current Assessment & Plan     Verbal consent obtained.  Influenza vaccine was administered.  Ok to take acetaminophen for soreness of arm.          Relevant Medications    influenza (Flulaval, Fluzone, Fluarix) 45 mcg/0.5 mL IM vaccine (> or = 6 mo) 0.5 mL (Completed)       Endocrine    Type 2 diabetes mellitus without complication, without long-term current use of insulin - Primary    Current Assessment & Plan      Latest Reference Range & Units 11/20/24 08:02   Hemoglobin A1C, POC % 9.5     Not at goal.  Continue Invokamet, glipizide as prescribed - refilled today.  Increase ozempic to 2 mg weekly.  Repeat HgbA1c in 3 months.  Follow ADA diet.   Check blood glucose twice daily and as needed. Bring log to every office visit.  Avoid soda, simple sweets, and limit rice/pasta/bread/starches and consume brown options when possible.    Maintain healthy weight with BMI goal <30.    Perform aerobic exercise for 150 minutes per week (or 5 days a week for 30 minutes each day).    Examine feet daily.    Obtain annual dilated eye exam.   Kidney Protection: Losartan  Statin Therapy: Simvastatin  Eye exam: 12/28/2023  Foot exam:  2/20/2024         Relevant Medications    semaglutide (OZEMPIC) 2 mg/dose (8 mg/3 mL) PnIj    Other Relevant Orders    Hemoglobin A1C, POCT (Completed)    Urinalysis, Reflex to Urine Culture    Microalbumin/Creatinine Ratio, Urine    Lipid Panel    Hemoglobin A1C    Comprehensive Metabolic Panel    Class 1 obesity due to excess calories with serious comorbidity and body mass index (BMI) of 33.0 to 33.9 in adult    Current Assessment & Plan     Body mass index is 33.16 kg/m².  Goal BMI <30.  Aerobic exercise 150 minutes per week.  Avoid soda, simple sugars, sweets, excessive rice, pasta, potatoes or bread.   Choose brown options when available and portion control.  Limit fast foods and fried foods.   Choose complex carbs in moderation (ex:  green, leafy vegetables, beans, oatmeal).  Eat plenty of fresh fruits and vegetables with lean meats daily.   Consider permanent healthy lifestyle changes.          Relevant Orders    Vitamin D    TSH    T4, Free       Other    Tobacco user    Current Assessment & Plan     Dangers of tobacco use was reviewed with patient in detail. Patient was Counseled for 3 minutes. Nicotine replacement options were discussed. Nicotine replacement was discussed- not prescribed per patient's request.          Other Visit Diagnoses       Screening for prostate cancer        Relevant Orders    PSA, Screening               Health Maintenance Due   Topic Date Due    COVID-19 Vaccine (4 - 2024-25 season) 09/01/2024    Eye Exam  12/28/2024     Tests to Keep You Healthy    Eye Exam: Met on 12/28/2023  Colon Cancer Screening: Met on 10/5/2023  Last Blood Pressure <= 139/89 (11/20/2024): Yes  Last HbA1c < 8 (08/19/2024): NO  Tobacco Cessation: NO      Health Maintenance Topics with due status: Not Due       Topic Last Completion Date    TETANUS VACCINE 09/15/2020    Colorectal Cancer Screening 10/05/2023    Foot Exam 02/20/2024    Lipid Panel 08/19/2024    Diabetes Urine Screening 11/18/2024    Low Dose Statin 11/20/2024    Hemoglobin A1c 11/20/2024    RSV Vaccine (Age 60+ and Pregnant patients) Not Due       Future Appointments   Date Time Provider Department Center   11/20/2024  2:30 PM [facility]        Follow up in about 3 months (around 2/20/2025) for F2F, Follow up, Med check, Lab review, DM, Wellness, RTC PRN.          Signature:        LUCINA Simon  [facility]  [address] [id number]    Date of encounter: 11/20/24

## 2024-11-20 NOTE — ASSESSMENT & PLAN NOTE
Body mass index is 33.16 kg/m².  Goal BMI <30.  Aerobic exercise 150 minutes per week.  Avoid soda, simple sugars, sweets, excessive rice, pasta, potatoes or bread.   Choose brown options when available and portion control.  Limit fast foods and fried foods.   Choose complex carbs in moderation (ex: green, leafy vegetables, beans, oatmeal).  Eat plenty of fresh fruits and vegetables with lean meats daily.   Consider permanent healthy lifestyle changes.

## 2024-11-20 NOTE — ASSESSMENT & PLAN NOTE
Latest Reference Range & Units 11/20/24 08:02   Hemoglobin A1C, POC % 9.5     Not at goal.  Continue Invokamet, glipizide as prescribed - refilled today.  Increase ozempic to 2 mg weekly.  Repeat HgbA1c in 3 months.  Follow ADA diet.   Check blood glucose twice daily and as needed. Bring log to every office visit.  Avoid soda, simple sweets, and limit rice/pasta/bread/starches and consume brown options when possible.    Maintain healthy weight with BMI goal <30.    Perform aerobic exercise for 150 minutes per week (or 5 days a week for 30 minutes each day).    Examine feet daily.    Obtain annual dilated eye exam.   Kidney Protection: Losartan  Statin Therapy: Simvastatin  Eye exam: 12/28/2023  Foot exam:  2/20/2024

## 2024-11-20 NOTE — ASSESSMENT & PLAN NOTE
Dangers of tobacco use was reviewed with patient in detail. Patient was Counseled for 3 minutes. Nicotine replacement options were discussed. Nicotine replacement was discussed- not prescribed per patient's request.

## 2024-11-20 NOTE — ASSESSMENT & PLAN NOTE
Verbal consent obtained.  Influenza vaccine was administered.  Ok to take acetaminophen for soreness of arm.

## 2025-01-31 ENCOUNTER — TELEPHONE (OUTPATIENT)
Dept: INTERNAL MEDICINE | Facility: CLINIC | Age: 50
End: 2025-01-31
Payer: MEDICAID

## 2025-01-31 DIAGNOSIS — E11.9 TYPE 2 DIABETES MELLITUS WITHOUT COMPLICATION, WITHOUT LONG-TERM CURRENT USE OF INSULIN: Primary | ICD-10-CM

## 2025-01-31 RX ORDER — TIRZEPATIDE 2.5 MG/.5ML
2.5 INJECTION, SOLUTION SUBCUTANEOUS
Qty: 2 ML | Refills: 5 | Status: SHIPPED | OUTPATIENT
Start: 2025-01-31 | End: 2025-01-31

## 2025-01-31 RX ORDER — METFORMIN HYDROCHLORIDE EXTENDED-RELEASE TABLETS 1000 MG/1
1000 TABLET, FILM COATED, EXTENDED RELEASE ORAL 2 TIMES DAILY WITH MEALS
Qty: 180 TABLET | Refills: 2 | Status: SHIPPED | OUTPATIENT
Start: 2025-01-31 | End: 2025-01-31

## 2025-01-31 RX ORDER — METFORMIN HYDROCHLORIDE EXTENDED-RELEASE TABLETS 1000 MG/1
1000 TABLET, FILM COATED, EXTENDED RELEASE ORAL 2 TIMES DAILY WITH MEALS
Qty: 180 TABLET | Refills: 2 | Status: SHIPPED | OUTPATIENT
Start: 2025-01-31 | End: 2025-02-26

## 2025-01-31 RX ORDER — TIRZEPATIDE 2.5 MG/.5ML
2.5 INJECTION, SOLUTION SUBCUTANEOUS
Qty: 2 ML | Refills: 5 | Status: SHIPPED | OUTPATIENT
Start: 2025-01-31 | End: 2026-01-31

## 2025-01-31 NOTE — TELEPHONE ENCOUNTER
Please inform Forrest Benoit Jr. of the following:    Insurance is no longer covering ozempic or invokamet. I will d/c ozempic.  Will order metformin 1000 mg daily and canagliflozin 300 mg daily ( combo meds in invokamet). I will order mounjaro to take place of ozempic, unsure if it is covered by insurance, if denied, will order victoza.    Thank you,    Jeanne Mahmood, MABLEC

## 2025-02-03 NOTE — TELEPHONE ENCOUNTER
I spoke with patient and informed him that his insurance will no longer cover ozempic or Invokamet. PCP is ordering Metformin 1000mg daily and Canaglifozen 300mg daily and Mounjaro in place of the Ozempic, if Insurance does not cover Mounjaro, she will order Victoza. Patient verbalized understanding.

## 2025-02-03 NOTE — TELEPHONE ENCOUNTER
PA for Mounjaro got approved for 1 year but PA for Metformin was denied stating Note from payer: Denial: Regular: Not Medically Necessary

## 2025-02-19 ENCOUNTER — TELEPHONE (OUTPATIENT)
Dept: INTERNAL MEDICINE | Facility: CLINIC | Age: 50
End: 2025-02-19
Payer: MEDICAID

## 2025-02-19 NOTE — TELEPHONE ENCOUNTER
Called pt and informed him PA for Mounjaro was approved and Rx for Invokana was sent to [facility] pharmacy. Pt voiced understanding.

## 2025-02-19 NOTE — TELEPHONE ENCOUNTER
----- Message from Prateek sent at 2/18/2025  1:56 PM CST -----  .Who Called: Forrest Benoit Jr.Caller is requesting assistance/information from provider's office.Symptoms (please be specific): calling to check on the status of the  metFORMIN (FORTAMET) 1,000 mg 24hr tablet and the vitoza/ ozempic How long has patient had these symptoms:  n/aList of preferred pharmacies on file (remove unneeded): [unfilled]If different, enter pharmacy into here including location and phone number: [facility] pharmacy Preferred Method of Contact: Phone CallPatient's Preferred Phone Number on File: [phone] Best Call Back Number, if different:Additional Information:

## 2025-02-25 ENCOUNTER — LAB VISIT (OUTPATIENT)
Dept: LAB | Facility: HOSPITAL | Age: 50
End: 2025-02-25
Payer: MEDICAID

## 2025-02-25 DIAGNOSIS — E66.09 CLASS 1 OBESITY DUE TO EXCESS CALORIES WITH SERIOUS COMORBIDITY AND BODY MASS INDEX (BMI) OF 33.0 TO 33.9 IN ADULT: ICD-10-CM

## 2025-02-25 DIAGNOSIS — E66.811 CLASS 1 OBESITY DUE TO EXCESS CALORIES WITH SERIOUS COMORBIDITY AND BODY MASS INDEX (BMI) OF 33.0 TO 33.9 IN ADULT: ICD-10-CM

## 2025-02-25 DIAGNOSIS — I10 PRIMARY HYPERTENSION: ICD-10-CM

## 2025-02-25 DIAGNOSIS — E11.9 TYPE 2 DIABETES MELLITUS WITHOUT COMPLICATION, WITHOUT LONG-TERM CURRENT USE OF INSULIN: ICD-10-CM

## 2025-02-25 DIAGNOSIS — Z12.5 SCREENING FOR PROSTATE CANCER: ICD-10-CM

## 2025-02-25 LAB
25(OH)D3+25(OH)D2 SERPL-MCNC: 23 NG/ML (ref 30–80)
ALBUMIN SERPL-MCNC: 4.1 G/DL (ref 3.5–5)
ALBUMIN/GLOB SERPL: 1.1 RATIO (ref 1.1–2)
ALP SERPL-CCNC: 77 UNIT/L (ref 40–150)
ALT SERPL-CCNC: 55 UNIT/L (ref 0–55)
ANION GAP SERPL CALC-SCNC: 7 MEQ/L
AST SERPL-CCNC: 25 UNIT/L (ref 5–34)
BACTERIA #/AREA URNS AUTO: ABNORMAL /HPF
BASOPHILS # BLD AUTO: 0.03 X10(3)/MCL
BASOPHILS NFR BLD AUTO: 0.4 %
BILIRUB SERPL-MCNC: 0.4 MG/DL
BILIRUB UR QL STRIP.AUTO: NEGATIVE
BUN SERPL-MCNC: 22.1 MG/DL (ref 8.9–20.6)
CALCIUM SERPL-MCNC: 9.9 MG/DL (ref 8.4–10.2)
CHLORIDE SERPL-SCNC: 109 MMOL/L (ref 98–107)
CHOLEST SERPL-MCNC: 160 MG/DL
CHOLEST/HDLC SERPL: 5 {RATIO} (ref 0–5)
CLARITY UR: CLEAR
CO2 SERPL-SCNC: 26 MMOL/L (ref 22–29)
COLOR UR AUTO: ABNORMAL
CREAT SERPL-MCNC: 1.42 MG/DL (ref 0.72–1.25)
CREAT UR-MCNC: 74.1 MG/DL (ref 63–166)
CREAT/UREA NIT SERPL: 16
EOSINOPHIL # BLD AUTO: 0.3 X10(3)/MCL (ref 0–0.9)
EOSINOPHIL NFR BLD AUTO: 4.4 %
ERYTHROCYTE [DISTWIDTH] IN BLOOD BY AUTOMATED COUNT: 14.5 % (ref 11.5–17)
EST. AVERAGE GLUCOSE BLD GHB EST-MCNC: 154.2 MG/DL
GFR SERPLBLD CREATININE-BSD FMLA CKD-EPI: >60 ML/MIN/1.73/M2
GLOBULIN SER-MCNC: 3.8 GM/DL (ref 2.4–3.5)
GLUCOSE SERPL-MCNC: 188 MG/DL (ref 74–100)
GLUCOSE UR QL STRIP: ABNORMAL
HBA1C MFR BLD: 7 %
HCT VFR BLD AUTO: 45.4 % (ref 42–52)
HDLC SERPL-MCNC: 32 MG/DL (ref 35–60)
HGB BLD-MCNC: 15 G/DL (ref 14–18)
HGB UR QL STRIP: NEGATIVE
HYALINE CASTS #/AREA URNS LPF: ABNORMAL /LPF
IMM GRANULOCYTES # BLD AUTO: 0.01 X10(3)/MCL (ref 0–0.04)
IMM GRANULOCYTES NFR BLD AUTO: 0.1 %
KETONES UR QL STRIP: NEGATIVE
LDLC SERPL CALC-MCNC: 103 MG/DL (ref 50–140)
LEUKOCYTE ESTERASE UR QL STRIP: NEGATIVE
LYMPHOCYTES # BLD AUTO: 3.2 X10(3)/MCL (ref 0.6–4.6)
LYMPHOCYTES NFR BLD AUTO: 47.4 %
MCH RBC QN AUTO: 28.4 PG (ref 27–31)
MCHC RBC AUTO-ENTMCNC: 33 G/DL (ref 33–36)
MCV RBC AUTO: 85.8 FL (ref 80–94)
MICROALBUMIN UR-MCNC: <5 UG/ML
MICROALBUMIN/CREAT RATIO PNL UR: NORMAL
MONOCYTES # BLD AUTO: 0.72 X10(3)/MCL (ref 0.1–1.3)
MONOCYTES NFR BLD AUTO: 10.7 %
NEUTROPHILS # BLD AUTO: 2.49 X10(3)/MCL (ref 2.1–9.2)
NEUTROPHILS NFR BLD AUTO: 37 %
NITRITE UR QL STRIP: NEGATIVE
NRBC BLD AUTO-RTO: 0 %
PH UR STRIP: 5 [PH]
PLATELET # BLD AUTO: 297 X10(3)/MCL (ref 130–400)
PMV BLD AUTO: 9.5 FL (ref 7.4–10.4)
POTASSIUM SERPL-SCNC: 5.3 MMOL/L (ref 3.5–5.1)
PROT SERPL-MCNC: 7.9 GM/DL (ref 6.4–8.3)
PROT UR QL STRIP: NEGATIVE
PSA SERPL-MCNC: 0.42 NG/ML
RBC # BLD AUTO: 5.29 X10(6)/MCL (ref 4.7–6.1)
RBC #/AREA URNS AUTO: ABNORMAL /HPF
SODIUM SERPL-SCNC: 142 MMOL/L (ref 136–145)
SP GR UR STRIP.AUTO: 1.02 (ref 1–1.03)
SQUAMOUS #/AREA URNS LPF: ABNORMAL /HPF
T4 FREE SERPL-MCNC: 0.8 NG/DL (ref 0.7–1.48)
TRIGL SERPL-MCNC: 126 MG/DL (ref 34–140)
TSH SERPL-ACNC: 0.7 UIU/ML (ref 0.35–4.94)
UROBILINOGEN UR STRIP-ACNC: NORMAL
VLDLC SERPL CALC-MCNC: 25 MG/DL
WBC # BLD AUTO: 6.75 X10(3)/MCL (ref 4.5–11.5)
WBC #/AREA URNS AUTO: ABNORMAL /HPF

## 2025-02-25 PROCEDURE — 80061 LIPID PANEL: CPT

## 2025-02-25 PROCEDURE — 84439 ASSAY OF FREE THYROXINE: CPT

## 2025-02-25 PROCEDURE — 85025 COMPLETE CBC W/AUTO DIFF WBC: CPT

## 2025-02-25 PROCEDURE — 80053 COMPREHEN METABOLIC PANEL: CPT

## 2025-02-25 PROCEDURE — 81015 MICROSCOPIC EXAM OF URINE: CPT

## 2025-02-25 PROCEDURE — 82306 VITAMIN D 25 HYDROXY: CPT

## 2025-02-25 PROCEDURE — 36415 COLL VENOUS BLD VENIPUNCTURE: CPT

## 2025-02-25 PROCEDURE — 84153 ASSAY OF PSA TOTAL: CPT

## 2025-02-25 PROCEDURE — 83036 HEMOGLOBIN GLYCOSYLATED A1C: CPT

## 2025-02-25 PROCEDURE — 82570 ASSAY OF URINE CREATININE: CPT

## 2025-02-25 PROCEDURE — 84443 ASSAY THYROID STIM HORMONE: CPT

## 2025-02-26 ENCOUNTER — RESULTS FOLLOW-UP (OUTPATIENT)
Dept: INTERNAL MEDICINE | Facility: CLINIC | Age: 50
End: 2025-02-26

## 2025-02-26 ENCOUNTER — OFFICE VISIT (OUTPATIENT)
Dept: INTERNAL MEDICINE | Facility: CLINIC | Age: 50
End: 2025-02-26
Payer: MEDICAID

## 2025-02-26 ENCOUNTER — LAB VISIT (OUTPATIENT)
Dept: LAB | Facility: HOSPITAL | Age: 50
End: 2025-02-26
Payer: MEDICAID

## 2025-02-26 VITALS
SYSTOLIC BLOOD PRESSURE: 125 MMHG | BODY MASS INDEX: 33.14 KG/M2 | WEIGHT: 194.13 LBS | HEIGHT: 64 IN | DIASTOLIC BLOOD PRESSURE: 85 MMHG | RESPIRATION RATE: 16 BRPM | OXYGEN SATURATION: 99 % | TEMPERATURE: 98 F | HEART RATE: 80 BPM

## 2025-02-26 DIAGNOSIS — I10 PRIMARY HYPERTENSION: ICD-10-CM

## 2025-02-26 DIAGNOSIS — E87.5 HYPERKALEMIA: ICD-10-CM

## 2025-02-26 DIAGNOSIS — N52.9 ERECTILE DYSFUNCTION, UNSPECIFIED ERECTILE DYSFUNCTION TYPE: ICD-10-CM

## 2025-02-26 DIAGNOSIS — E78.2 MIXED HYPERLIPIDEMIA: ICD-10-CM

## 2025-02-26 DIAGNOSIS — E11.9 TYPE 2 DIABETES MELLITUS WITHOUT COMPLICATION, WITHOUT LONG-TERM CURRENT USE OF INSULIN: ICD-10-CM

## 2025-02-26 DIAGNOSIS — Z00.00 WELL ADULT EXAM: Primary | ICD-10-CM

## 2025-02-26 DIAGNOSIS — K21.9 GASTROESOPHAGEAL REFLUX DISEASE WITHOUT ESOPHAGITIS: ICD-10-CM

## 2025-02-26 DIAGNOSIS — B35.6 JOCK ITCH: ICD-10-CM

## 2025-02-26 DIAGNOSIS — E66.09 CLASS 1 OBESITY DUE TO EXCESS CALORIES WITH SERIOUS COMORBIDITY AND BODY MASS INDEX (BMI) OF 33.0 TO 33.9 IN ADULT: ICD-10-CM

## 2025-02-26 DIAGNOSIS — E66.811 CLASS 1 OBESITY DUE TO EXCESS CALORIES WITH SERIOUS COMORBIDITY AND BODY MASS INDEX (BMI) OF 33.0 TO 33.9 IN ADULT: ICD-10-CM

## 2025-02-26 LAB
ANION GAP SERPL CALC-SCNC: 5 MEQ/L
BUN SERPL-MCNC: 18.1 MG/DL (ref 8.9–20.6)
CALCIUM SERPL-MCNC: 9.7 MG/DL (ref 8.4–10.2)
CHLORIDE SERPL-SCNC: 107 MMOL/L (ref 98–107)
CO2 SERPL-SCNC: 28 MMOL/L (ref 22–29)
CREAT SERPL-MCNC: 1.19 MG/DL (ref 0.72–1.25)
CREAT/UREA NIT SERPL: 15
GFR SERPLBLD CREATININE-BSD FMLA CKD-EPI: >60 ML/MIN/1.73/M2
GLUCOSE SERPL-MCNC: 202 MG/DL (ref 74–100)
POTASSIUM SERPL-SCNC: 4.5 MMOL/L (ref 3.5–5.1)
SODIUM SERPL-SCNC: 140 MMOL/L (ref 136–145)

## 2025-02-26 PROCEDURE — 36415 COLL VENOUS BLD VENIPUNCTURE: CPT

## 2025-02-26 PROCEDURE — 99214 OFFICE O/P EST MOD 30 MIN: CPT | Mod: PBBFAC

## 2025-02-26 PROCEDURE — 80048 BASIC METABOLIC PNL TOTAL CA: CPT

## 2025-02-26 RX ORDER — PANTOPRAZOLE SODIUM 40 MG/1
40 TABLET, DELAYED RELEASE ORAL DAILY
Qty: 90 TABLET | Refills: 2 | Status: SHIPPED | OUTPATIENT
Start: 2025-02-26 | End: 2026-02-26

## 2025-02-26 RX ORDER — GLIPIZIDE 5 MG/1
5 TABLET, FILM COATED, EXTENDED RELEASE ORAL
Qty: 90 TABLET | Refills: 2 | Status: SHIPPED | OUTPATIENT
Start: 2025-02-26 | End: 2026-02-26

## 2025-02-26 RX ORDER — ASPIRIN 81 MG/1
81 TABLET ORAL DAILY
Qty: 90 TABLET | Refills: 1 | Status: SHIPPED | OUTPATIENT
Start: 2025-02-26 | End: 2026-02-26

## 2025-02-26 RX ORDER — FLUCONAZOLE 150 MG/1
150 TABLET ORAL DAILY
Qty: 1 TABLET | Refills: 0 | Status: SHIPPED | OUTPATIENT
Start: 2025-02-26 | End: 2025-02-27

## 2025-02-26 RX ORDER — SILDENAFIL 25 MG/1
25 TABLET, FILM COATED ORAL DAILY PRN
Qty: 30 TABLET | Refills: 4 | Status: SHIPPED | OUTPATIENT
Start: 2025-02-26 | End: 2026-02-26

## 2025-02-26 RX ORDER — AMLODIPINE BESYLATE 5 MG/1
5 TABLET ORAL DAILY
Qty: 90 TABLET | Refills: 2 | Status: SHIPPED | OUTPATIENT
Start: 2025-02-26 | End: 2026-02-26

## 2025-02-26 RX ORDER — ATORVASTATIN CALCIUM 80 MG/1
80 TABLET, FILM COATED ORAL DAILY
Qty: 90 TABLET | Refills: 3 | Status: SHIPPED | OUTPATIENT
Start: 2025-02-26 | End: 2026-02-26

## 2025-02-26 RX ORDER — NYSTATIN 100000 [USP'U]/G
POWDER TOPICAL 2 TIMES DAILY PRN
Qty: 30 G | Refills: 2 | Status: SHIPPED | OUTPATIENT
Start: 2025-02-26 | End: 2026-02-26

## 2025-02-26 RX ORDER — EZETIMIBE 10 MG/1
10 TABLET ORAL DAILY
Qty: 90 TABLET | Refills: 2 | Status: SHIPPED | OUTPATIENT
Start: 2025-02-26 | End: 2026-02-26

## 2025-02-26 NOTE — PROGRESS NOTES
PATIENT NAME: Forrest Benoit Jr.  : 1975  DATE: 25  MRN: [id number]          Reason for Visit/Chief Complaint   Follow-up, Diabetes, and Lab Review       History of Present Illness (HPI)     Forrest Benoit Jr. is a 49 y.o. Black or  male presenting in clinic today Follow-up, Diabetes, and Lab Review.  PMH HTN, DM2, HLD, GERD, and ALIF. He is followed by Dr. John Juárez (neurosurgeon). [place] He is also followed by [facility] clinic.     Patient reports improvement in diabetes management with A1C decreasing from 8.9 to 7.0 and increased morning energy levels. He recently started Mounjaro 2.5 mg last week without any GI upset. Current diabetes medications include Invokana, glipizide, and Mounjaro. He developed a rash after taking Invokana. He reports consuming fried foods approximately once per week. Potassium level was elevated. Blood counts were normal with no evidence of infection or anemia. Vitamin D level was low at 23. Thyroid studies and prostate numbers were normal. Urinalysis was normal except for 4+ glucose, attributed to Invokana use. He reports increased water intake prior to the lab draw.     Smokes cigars a few times per week. Drinks alcohol occasionally, denies illicit drug use. Denies chest pain, shortness of breath, cough, headache, dizziness, weakness, abdominal pain, nausea, vomiting, diarrhea, constipation, dysuria, depression, anxiety, SI, and HI.     Prostate Cancer Screening - 2025 - PSA 0.34. Follow up annually.   Colon Cancer Screening - 10/5/2023 - Colonoscopy : diverticulosis. Repeat in 5 years.  Eye Exam - 2023. Followed by [facility] eye clinic.  Vaccinations: Flu - 2024 / Pneumococcal - 2024 / Tetanus - 9/15/2020 / Covid - 2021, 2021, 1/3/2022     Review of Systems     Review of Systems   Constitutional: Negative.    HENT: Negative.     Eyes: Negative.    Respiratory: Negative.      Cardiovascular: Negative.    Gastrointestinal: Negative.    Endocrine: Negative.    Genitourinary: Negative.    Musculoskeletal: Negative.    Skin: Negative.    Allergic/Immunologic: Negative.    Neurological: Negative.    Hematological: Negative.    Psychiatric/Behavioral: Negative.     All other systems reviewed and are negative.      Medical / Social / Family History     Past Medical History:   Diagnosis Date    Dependence on nicotine from cigarettes     HTN (hypertension)     Lumbar disc herniation     Mixed hyperlipidemia     Myopia, bilateral     Type 2 diabetes mellitus without complication, with long-term current use of insulin          Past Surgical History:   Procedure Laterality Date    COLONOSCOPY N/A 10/5/2023    Procedure: COLONOSCOPY;  Surgeon: Rio Tyler MD;  Location: [facility] ENDOSCOPY;  Service: Endoscopy;  Laterality: N/A;    FLUOROSCOPIC GUIDANCE FOR SPINAL INJECTION           Social History  Forrest Benoit Jr.'s  reports that he has been smoking cigarettes and cigars. He has never used smokeless tobacco. He reports current alcohol use. He reports that he does not use drugs.    Family History  Forrest Benoit Jr.'s family history includes Diabetes type II in his father; Hypertension in his father.    Medications and Allergies     Medications  Current Outpatient Medications   Medication Instructions    amLODIPine (NORVASC) 5 mg, Oral, Daily    aspirin (ECOTRIN) 81 mg, Oral, Daily    atorvastatin (LIPITOR) 80 mg, Oral, Daily    canagliflozin (INVOKANA) 300 mg, Oral, Daily    ezetimibe (ZETIA) 10 mg, Oral, Daily    fluconazole (DIFLUCAN) 150 mg, Oral, Daily    glipiZIDE 5 mg, Oral, With breakfast    ibuprofen (ADVIL,MOTRIN) 800 mg, Oral, 3 times daily PRN    losartan (COZAAR) 25 mg, Oral, Daily    methocarbamoL (ROBAXIN) 750 mg, Oral, 3 times daily PRN    MOUNJARO 2.5 mg, Subcutaneous, Every 7 days    nystatin (MYCOSTATIN) powder Topical (Top), 2 times daily PRN    pantoprazole  "(PROTONIX) 40 mg, Oral, Daily    pen needle, diabetic (TECHLITE PEN NEEDLE) 32 gauge x 5/32" Ndle 1 each, Subcutaneous, 2 times daily    sildenafiL (VIAGRA) 25 mg, Oral, Daily PRN       Allergies  Review of patient's allergies indicates:  No Known Allergies    Physical Examination   Visit Vitals  /85 (BP Location: Right arm, Patient Position: Sitting)   Pulse 80   Temp 98.1 °F (36.7 °C) (Oral)   Resp 16   Ht 5' 4" (1.626 m)   Wt 88 kg (194 lb 1.6 oz)   SpO2 99%   BMI 33.32 kg/m²     Physical Exam  Vitals reviewed.   Constitutional:       Appearance: Normal appearance. He is normal weight.   HENT:      Head: Normocephalic.      Nose: Nose normal.      Mouth/Throat:      Mouth: Mucous membranes are moist.      Pharynx: Oropharynx is clear.   Eyes:      Extraocular Movements: Extraocular movements intact.      Conjunctiva/sclera: Conjunctivae normal.      Pupils: Pupils are equal, round, and reactive to light.   Cardiovascular:      Rate and Rhythm: Normal rate and regular rhythm.      Heart sounds: Normal heart sounds.   Pulmonary:      Effort: Pulmonary effort is normal.      Breath sounds: Normal breath sounds.   Abdominal:      General: Abdomen is flat. Bowel sounds are normal.      Palpations: Abdomen is soft.   Musculoskeletal:         General: Normal range of motion.      Cervical back: Normal range of motion.   Skin:     General: Skin is warm and dry.      Capillary Refill: Capillary refill takes less than 2 seconds.   Neurological:      General: No focal deficit present.      Mental Status: He is alert and oriented to person, place, and time.   Psychiatric:         Mood and Affect: Mood normal.           Results     Lab Results   Component Value Date    WBC 6.75 02/25/2025    RBC 5.29 02/25/2025    HGB 15.0 02/25/2025    HCT 45.4 02/25/2025    MCV 85.8 02/25/2025    MCH 28.4 02/25/2025    MCHC 33.0 02/25/2025    RDW 14.5 02/25/2025     02/25/2025    MPV 9.5 02/25/2025      Lab Results   Component " "Value Date     02/26/2025    K 4.5 02/26/2025    CO2 28 02/26/2025    GLUCOSE 202 (H) 02/26/2025    BUN 18.1 02/26/2025    CREATININE 1.19 02/26/2025    LABPROT 7.9 02/25/2025    ALBUMIN 4.1 02/25/2025    BILITOT 0.4 02/25/2025    ALKPHOS 77 02/25/2025    AST 25 02/25/2025    ALT 55 02/25/2025    AGAP 5.0 02/26/2025    EGFRNORACEVR >60 02/26/2025     Lab Results   Component Value Date    TSH 0.697 02/25/2025     Lab Results   Component Value Date    CHOL 160 02/25/2025    HDL 32 (L) 02/25/2025    .00 02/25/2025    TRIG 126 02/25/2025     Lab Results   Component Value Date    SGUA 1.016 02/25/2025    PROTEINUA Negative 02/25/2025    BILIRUBINUA Negative 02/25/2025    WBCUA 0-5 02/25/2025    RBCUA 0-5 02/25/2025    BACTERIA None Seen 02/25/2025    LEUKOCYTESUR Negative 02/25/2025    UROBILINOGEN Normal 02/25/2025     Lab Results   Component Value Date    CREATRANDUR 74.1 02/25/2025    MICALBCREAT  02/25/2025      Comment:      Unable to Calculate     Lab Results   Component Value Date    KAOSXJUB89YX 23 (L) 02/25/2025    FOLATE 11.2 11/10/2020     Lab Results   Component Value Date    HIV Nonreactive 11/30/2022    HEPAIGM Nonreactive 11/30/2022    HEPBSAG Nonreactive 11/30/2022    HEPCAB Nonreactive 11/30/2022     No results found for: "FITDIAG", "COLOGUARD"  Lab Results   Component Value Date    OCCBLDIA Positive (A) 09/19/2023       Assessment and Plan (including Health Maintenance)     Problem List Items Addressed This Visit          Derm    Jock itch    Current Assessment & Plan   Prescribed Diflucan 150 mg x1 for rash treatment.  Prescribed nystatin powder for rash application.  Confirmed presence of rash, likely due to Invokana.  Plan to reassess if rash persists after treatment.  Confirmed presence of rash as an adverse effect of medication (Invokana).  Prescribed Diflucan pill and nystatin powder for rash treatment.  Plan to reassess if adverse effect persists after treatment.         Relevant " Medications    nystatin (MYCOSTATIN) powder    fluconazole (DIFLUCAN) 150 MG Tab       Cardiac/Vascular    Mixed hyperlipidemia    Current Assessment & Plan   Lab Results   Component Value Date    .00 02/25/2025     Not at goal.  Lab Results   Component Value Date    TRIG 126 02/25/2025       Lab Results   Component Value Date    HDL 32 (L) 02/25/2025        Lab Results   Component Value Date    CHOL 160 02/25/2025     LDL not at goal.  Continue atorvastatin - no longer experiencing nausea.  Continue zetia as prescribed - refilled today.   Follow a low cholesterol, low saturated fat diet with less than 200 mg of cholesterol a day.   Avoid fried foods and high saturated fats.  Add flax seed or fish oil supplements to diet.   Increase dietary fiber.   Regular exercise improves cholesterol levels.  Physical activity 5 times a week for 30 minutes per day (or 150 minutes per week).   Stressed importance of dietary modifications.          Relevant Medications    atorvastatin (LIPITOR) 80 MG tablet    ezetimibe (ZETIA) 10 mg tablet    Primary hypertension    Current Assessment & Plan   BP Readings from Last 3 Encounters:   02/26/25 125/85   11/20/24 128/85   08/20/24 127/85      At goal.  Follow a low sodium (less than 2 grams of sodium per day), DASH diet.   Continue amlodipine, losartan as prescribed.  Monitor blood pressure and report any consistent values greater than 140/90 and keep a log.  Encouraged smoking cessation to aid in BP reduction and co-morbidities.   Maintain healthy weight with a BMI goal of <30.   Aerobic exercise for 150 minutes per week (or 5 days a week for 30 minutes each day).          Relevant Medications    amLODIPine (NORVASC) 5 MG tablet    aspirin (ECOTRIN) 81 MG EC tablet       Renal/    Erectile dysfunction    Current Assessment & Plan   Continue sildenafil as prescribed - refilled todau.  Report to the ED immediately if you experience any of the following symptoms:  chest pain,  lightheadedness, dizziness, SOB, hypotension, nausea, vomiting, or an erection that lasts longer than 4 hours.   Stop taking the medicine immediately!          Relevant Medications    sildenafiL (VIAGRA) 25 MG tablet    Hyperkalemia    Current Assessment & Plan   Lab Results   Component Value Date    K 4.5 02/26/2025     Ordered BMP (Basic Metabolic Panel) to recheck potassium level.  Instructed the patient to contact the office today or tomorrow for potassium results and potential medication changes.  Noted potassium level was high in recent labs.  Considering discontinuation of losartan if potassium remains elevated.          Relevant Orders    Basic Metabolic Panel (Completed)       Endocrine    Type 2 diabetes mellitus without complication, without long-term current use of insulin    Current Assessment & Plan   Lab Results   Component Value Date    HGBA1C 7.0 02/25/2025    HGBA1C 8.9 (H) 08/19/2024    HGBA1C 6.8 02/19/2024    HGBA1C 6.7 09/18/2023     Lab Results   Component Value Date    HGBA1C 7.0 02/25/2025    HGBA1C 8.9 (H) 08/19/2024    HGBA1C 6.8 02/19/2024    HGBA1C 6.7 09/18/2023   At goal.  Continue invokana, glipizide, and mounjaro as prescribed.   Follow ADA diet.   Check blood glucose twice daily and as needed. Bring log to every office visit.  Avoid soda, simple sweets, and limit rice/pasta/bread/starches and consume brown options when possible.    Maintain healthy weight with BMI goal <30.    Perform aerobic exercise for 150 minutes per week (or 5 days a week for 30 minutes each day).    Examine feet daily.    Obtain annual dilated eye exam.   Kidney Protection: Losartan  Statin Therapy: Simvastatin  Eye exam: 12/28/2023  Foot exam:  2/20/2024         Relevant Medications    glipiZIDE 5 MG TR24    Other Relevant Orders    Comprehensive Metabolic Panel    Hemoglobin A1C    Microalbumin/Creatinine Ratio, Urine    Urinalysis    Class 1 obesity due to excess calories with serious comorbidity and body  mass index (BMI) of 33.0 to 33.9 in adult    Current Assessment & Plan   Body mass index is 33.32 kg/m².  Goal BMI <30.  Aerobic exercise 150 minutes per week.  Avoid soda, simple sugars, sweets, excessive rice, pasta, potatoes or bread.   Choose brown options when available and portion control.  Limit fast foods and fried foods.   Choose complex carbs in moderation (ex: green, leafy vegetables, beans, oatmeal).  Eat plenty of fresh fruits and vegetables with lean meats daily.   Consider permanent healthy lifestyle changes.             GI    Gastroesophageal reflux disease without esophagitis    Current Assessment & Plan   Continue pantoprazole as prescribed.  Avoid spicy, acidic, fried food and alcohol.    Eat 2-3 hours before bed.   Avoid tight fitting clothes and chew food thoroughly.    Reduce caffeine intake, avoid soda.    Take meds as prescribed.    Encouraged smoking cessation.           Relevant Medications    pantoprazole (PROTONIX) 40 MG tablet       Other    Well adult exam - Primary    Current Assessment & Plan   Wellness labs - 2/25/2025   Prostate Cancer Screening - 2/25/2025 - PSA 0.34. Follow up annually.   Colon Cancer Screening - 10/5/2023 - Colonoscopy : diverticulosis. Repeat in 5 years.  Eye Exam - 12/28/2023. Followed by [facility] eye clinic.  Vaccinations: Flu - 11/20/2024 / Pneumococcal - 2/20/2024 / Tetanus - 9/15/2020 / Covid - 5/21/2021, 6/22/2021, 1/3/2022          PLAN SUMMARY:  Order BMP to recheck potassium level  Prescribe Diflucan (1 pill) and nystatin powder for rash treatment  Consider discontinuing losartan if potassium remains elevated  Continue Invokana, glipizide, and Mounjaro 2.5 mg weekly injections  Continue cholesterol medication  Continue once-weekly Vitamin D supplement  Patient to contact office for potassium results and potential medication changes  Follow-up in 3 months for A1C reassessment  Reassess if rash persists after treatment        Health Maintenance Due   Topic  Date Due    COVID-19 Vaccine (4 - 2024-25 season) 09/01/2024    Diabetic Eye Exam  12/28/2024    Foot Exam  02/20/2025     Tests to Keep You Healthy    Eye Exam: DUE  Colon Cancer Screening: Met on 10/5/2023  Last Blood Pressure <= 139/89 (2/26/2025): Yes  Last HbA1c < 8 (02/25/2025): Yes  Tobacco Cessation: NO      Health Maintenance Topics with due status: Not Due       Topic Last Completion Date    TETANUS VACCINE 09/15/2020    Colorectal Cancer Screening 10/05/2023    Diabetes Urine Screening 02/25/2025    Lipid Panel 02/25/2025    Hemoglobin A1c 02/25/2025    Low Dose Statin 02/26/2025    RSV Vaccine (Age 60+ and Pregnant patients) Not Due       Future Appointments   Date Time Provider Department Center   5/30/2025  7:00 AM Jeanne Mahmood FNP [facility]        Follow up in about 3 months (around 5/26/2025) for Virtual Visit, Follow up, Med check, Lab review, DM, RTC PRN.          Signature:        LUCINA Simon  [facility]  [address] [address]    Date of encounter: 2/26/25    This note was generated with the assistance of ambient listening technology. Verbal consent was obtained by the patient and accompanying visitor(s) for the recording of patient appointment to facilitate this note. I attest to having reviewed and edited the generated note for accuracy, though some syntax or spelling errors may persist. Please contact the author of this note for any clarification.

## 2025-02-26 NOTE — PATIENT INSTRUCTIONS
REMINDER: Please complete labs within 1 week of appointment.   Please complete satisfaction survey when received. Thank you.    Paul Clayton,     If you are due for any health screening(s) below please notify me so we can arrange them to be ordered and scheduled. Most healthy patients at your age complete them, but you are free to accept or refuse.     If you can't do it, I'll definitely understand. If you can, I'd certainly appreciate it!    Tests to Keep You Healthy    Eye Exam: DUE  Colon Cancer Screening: Met on 10/5/2023  Last Blood Pressure <= 139/89 (2/26/2025): Yes  Last HbA1c < 8 (02/25/2025): Yes      Your diabetic retinal eye exam is due     Diabetes is the #1 cause of blindness in the [place] - early detection before signs or symptoms develop can prevent debilitating blindness.     Our records indicate that you may be overdue for your annual diabetic eye exam. Eye screening can help identify patients at risk for developing vision loss which is common in diabetes. This simple screening is an important step to keeping you healthy and preventing complications from diabetes.     This recommended diabetic eye exam should take place once per year and can prevent and treat diabetes complications in the eye before developing symptoms. This can be done with a special camera is used to take photographs of the back of your eye without having to dilate them, or you can see an eye doctor for a full dilated exam.     If you recently had your yearly diabetic eye exam performed outside of [facility], please let your Health care team know so that they can update your health record.        Were here to help you quit smoking     Our records indicated that you are still smoking. One of the best things you can do for your health is to stop smoking and we are here to help.     Talk with your provider about our Smoking Cessation Program and how we can support you on your journey.

## 2025-02-27 PROBLEM — B35.6 JOCK ITCH: Status: ACTIVE | Noted: 2025-02-27

## 2025-02-27 PROBLEM — E87.5 HYPERKALEMIA: Status: ACTIVE | Noted: 2025-02-27

## 2025-02-27 NOTE — ASSESSMENT & PLAN NOTE
Prescribed Diflucan 150 mg x1 for rash treatment.  Prescribed nystatin powder for rash application.  Confirmed presence of rash, likely due to Invokana.  Plan to reassess if rash persists after treatment.  Confirmed presence of rash as an adverse effect of medication (Invokana).  Prescribed Diflucan pill and nystatin powder for rash treatment.  Plan to reassess if adverse effect persists after treatment.

## 2025-02-27 NOTE — ASSESSMENT & PLAN NOTE
Lab Results   Component Value Date    .00 02/25/2025     Not at goal.  Lab Results   Component Value Date    TRIG 126 02/25/2025       Lab Results   Component Value Date    HDL 32 (L) 02/25/2025        Lab Results   Component Value Date    CHOL 160 02/25/2025     LDL not at goal.  Continue atorvastatin - no longer experiencing nausea.  Continue zetia as prescribed - refilled today.   Follow a low cholesterol, low saturated fat diet with less than 200 mg of cholesterol a day.   Avoid fried foods and high saturated fats.  Add flax seed or fish oil supplements to diet.   Increase dietary fiber.   Regular exercise improves cholesterol levels.  Physical activity 5 times a week for 30 minutes per day (or 150 minutes per week).   Stressed importance of dietary modifications.

## 2025-02-27 NOTE — ASSESSMENT & PLAN NOTE
Wellness labs - 2/25/2025   Prostate Cancer Screening - 2/25/2025 - PSA 0.34. Follow up annually.   Colon Cancer Screening - 10/5/2023 - Colonoscopy : diverticulosis. Repeat in 5 years.  Eye Exam - 12/28/2023. Followed by [facility] eye clinic.  Vaccinations: Flu - 11/20/2024 / Pneumococcal - 2/20/2024 / Tetanus - 9/15/2020 / Covid - 5/21/2021, 6/22/2021, 1/3/2022

## 2025-02-27 NOTE — ASSESSMENT & PLAN NOTE
Continue sildenafil as prescribed - refilled todau.  Report to the ED immediately if you experience any of the following symptoms:  chest pain, lightheadedness, dizziness, SOB, hypotension, nausea, vomiting, or an erection that lasts longer than 4 hours.   Stop taking the medicine immediately!

## 2025-02-27 NOTE — ASSESSMENT & PLAN NOTE
Body mass index is 33.32 kg/m².  Goal BMI <30.  Aerobic exercise 150 minutes per week.  Avoid soda, simple sugars, sweets, excessive rice, pasta, potatoes or bread.   Choose brown options when available and portion control.  Limit fast foods and fried foods.   Choose complex carbs in moderation (ex: green, leafy vegetables, beans, oatmeal).  Eat plenty of fresh fruits and vegetables with lean meats daily.   Consider permanent healthy lifestyle changes.

## 2025-02-27 NOTE — ASSESSMENT & PLAN NOTE
Lab Results   Component Value Date    K 4.5 02/26/2025     Ordered BMP (Basic Metabolic Panel) to recheck potassium level.  Instructed the patient to contact the office today or tomorrow for potassium results and potential medication changes.  Noted potassium level was high in recent labs.  Considering discontinuation of losartan if potassium remains elevated.

## 2025-02-27 NOTE — ASSESSMENT & PLAN NOTE
BP Readings from Last 3 Encounters:   02/26/25 125/85   11/20/24 128/85   08/20/24 127/85      At goal.  Follow a low sodium (less than 2 grams of sodium per day), DASH diet.   Continue amlodipine, losartan as prescribed.  Monitor blood pressure and report any consistent values greater than 140/90 and keep a log.  Encouraged smoking cessation to aid in BP reduction and co-morbidities.   Maintain healthy weight with a BMI goal of <30.   Aerobic exercise for 150 minutes per week (or 5 days a week for 30 minutes each day).

## 2025-02-27 NOTE — ASSESSMENT & PLAN NOTE
Lab Results   Component Value Date    HGBA1C 7.0 02/25/2025    HGBA1C 8.9 (H) 08/19/2024    HGBA1C 6.8 02/19/2024    HGBA1C 6.7 09/18/2023     Lab Results   Component Value Date    HGBA1C 7.0 02/25/2025    HGBA1C 8.9 (H) 08/19/2024    HGBA1C 6.8 02/19/2024    HGBA1C 6.7 09/18/2023   At goal.  Continue invokana, glipizide, and mounjaro as prescribed.   Follow ADA diet.   Check blood glucose twice daily and as needed. Bring log to every office visit.  Avoid soda, simple sweets, and limit rice/pasta/bread/starches and consume brown options when possible.    Maintain healthy weight with BMI goal <30.    Perform aerobic exercise for 150 minutes per week (or 5 days a week for 30 minutes each day).    Examine feet daily.    Obtain annual dilated eye exam.   Kidney Protection: Losartan  Statin Therapy: Simvastatin  Eye exam: 12/28/2023  Foot exam:  2/20/2024

## 2025-05-27 ENCOUNTER — LAB VISIT (OUTPATIENT)
Dept: LAB | Facility: HOSPITAL | Age: 50
End: 2025-05-27
Payer: MEDICAID

## 2025-05-27 DIAGNOSIS — E11.9 TYPE 2 DIABETES MELLITUS WITHOUT COMPLICATION, WITHOUT LONG-TERM CURRENT USE OF INSULIN: ICD-10-CM

## 2025-05-27 LAB
ALBUMIN SERPL-MCNC: 4 G/DL (ref 3.5–5)
ALBUMIN/GLOB SERPL: 1.1 RATIO (ref 1.1–2)
ALP SERPL-CCNC: 75 UNIT/L (ref 40–150)
ALT SERPL-CCNC: 52 UNIT/L (ref 0–55)
ANION GAP SERPL CALC-SCNC: 7 MEQ/L
AST SERPL-CCNC: 25 UNIT/L (ref 11–45)
BACTERIA #/AREA URNS AUTO: ABNORMAL /HPF
BILIRUB SERPL-MCNC: 0.5 MG/DL
BILIRUB UR QL STRIP.AUTO: NEGATIVE
BUN SERPL-MCNC: 20.6 MG/DL (ref 8.9–20.6)
CALCIUM SERPL-MCNC: 9.4 MG/DL (ref 8.4–10.2)
CHLORIDE SERPL-SCNC: 110 MMOL/L (ref 98–107)
CLARITY UR: CLEAR
CO2 SERPL-SCNC: 24 MMOL/L (ref 22–29)
COLOR UR AUTO: COLORLESS
CREAT SERPL-MCNC: 1.12 MG/DL (ref 0.72–1.25)
CREAT UR-MCNC: 46.9 MG/DL (ref 63–166)
CREAT/UREA NIT SERPL: 18
EST. AVERAGE GLUCOSE BLD GHB EST-MCNC: 154.2 MG/DL
GFR SERPLBLD CREATININE-BSD FMLA CKD-EPI: >60 ML/MIN/1.73/M2
GLOBULIN SER-MCNC: 3.5 GM/DL (ref 2.4–3.5)
GLUCOSE SERPL-MCNC: 114 MG/DL (ref 74–100)
GLUCOSE UR QL STRIP: ABNORMAL
HBA1C MFR BLD: 7 %
HGB UR QL STRIP: NEGATIVE
HYALINE CASTS #/AREA URNS LPF: ABNORMAL /LPF
KETONES UR QL STRIP: NEGATIVE
LEUKOCYTE ESTERASE UR QL STRIP: NEGATIVE
MICROALBUMIN UR-MCNC: 20.2 UG/ML
MICROALBUMIN/CREAT RATIO PNL UR: 43.1 MG/GM CR (ref 0–30)
NITRITE UR QL STRIP: NEGATIVE
PH UR STRIP: 5.5 [PH]
POTASSIUM SERPL-SCNC: 4.5 MMOL/L (ref 3.5–5.1)
PROT SERPL-MCNC: 7.5 GM/DL (ref 6.4–8.3)
PROT UR QL STRIP: NEGATIVE
RBC #/AREA URNS AUTO: ABNORMAL /HPF
SODIUM SERPL-SCNC: 141 MMOL/L (ref 136–145)
SP GR UR STRIP.AUTO: 1.01 (ref 1–1.03)
SPERM URNS QL MICRO: ABNORMAL /HPF
SQUAMOUS #/AREA URNS LPF: ABNORMAL /HPF
UROBILINOGEN UR STRIP-ACNC: NORMAL
WBC #/AREA URNS AUTO: ABNORMAL /HPF

## 2025-05-27 PROCEDURE — 80053 COMPREHEN METABOLIC PANEL: CPT

## 2025-05-27 PROCEDURE — 82570 ASSAY OF URINE CREATININE: CPT

## 2025-05-27 PROCEDURE — 36415 COLL VENOUS BLD VENIPUNCTURE: CPT

## 2025-05-27 PROCEDURE — 81001 URINALYSIS AUTO W/SCOPE: CPT

## 2025-05-27 PROCEDURE — 83036 HEMOGLOBIN GLYCOSYLATED A1C: CPT

## 2025-07-16 ENCOUNTER — TELEPHONE (OUTPATIENT)
Dept: INTERNAL MEDICINE | Facility: CLINIC | Age: 50
End: 2025-07-16
Payer: MEDICAID

## 2025-07-16 NOTE — TELEPHONE ENCOUNTER
Copied from CRM #[id number]. Topic: Medications - Medication Question  >> Jul 15, 2025  3:11 PM Prateek wrote:  .Who Called: Forrest Benoit Jr.    Caller is requesting assistance/information from provider's office.    Symptoms (please be specific): pt states that he is having an allergic reaction to the medication . Asked which one and he does not know    How long has patient had these symptoms:  n/a   List of preferred pharmacies on file (remove unneeded): [unfilled]  If different, enter pharmacy into here including location and phone number: n/a       Preferred Method of Contact: Phone Call  Patient's Preferred Phone Number on File: [phone]   Best Call Back Number, if different:  Additional Information:

## 2025-07-18 ENCOUNTER — TELEPHONE (OUTPATIENT)
Dept: INTERNAL MEDICINE | Facility: CLINIC | Age: 50
End: 2025-07-18
Payer: MEDICAID

## 2025-07-18 ENCOUNTER — PATIENT MESSAGE (OUTPATIENT)
Dept: INTERNAL MEDICINE | Facility: CLINIC | Age: 50
End: 2025-07-18
Payer: MEDICAID

## 2025-07-18 NOTE — TELEPHONE ENCOUNTER
Copied from CRM #[id number]. Topic: General Inquiry - Patient Advice  >> Jul 18, 2025 10:29 AM Carlton wrote:  Who Called: Forrest Benoit Jr.    Caller is requesting assistance/information from provider's office.    Symptoms (please be specific):    How long has patient had these symptoms:    List of preferred pharmacies on file (remove unneeded): [unfilled]  If different, enter pharmacy into here including location and phone number:       Preferred Method of Contact: Phone Call  Patient's Preferred Phone Number on File: [phone]   Best Call Back Number, if different:  Additional Information: pt called to follow up on message sent on  07/16, to report allergic reaction, please follow up

## 2025-07-18 NOTE — TELEPHONE ENCOUNTER
Returned call to patient and he is requesting medication for rash to groin. E-visit sent to patient.

## 2025-08-27 ENCOUNTER — OFFICE VISIT (OUTPATIENT)
Dept: INTERNAL MEDICINE | Facility: CLINIC | Age: 50
End: 2025-08-27
Payer: MEDICAID

## 2025-08-30 PROBLEM — M25.561 ACUTE PAIN OF RIGHT KNEE: Status: ACTIVE | Noted: 2025-08-30

## 2025-09-02 ENCOUNTER — TELEPHONE (OUTPATIENT)
Facility: CLINIC | Age: 50
End: 2025-09-02
Payer: MEDICAID

## 2025-09-02 DIAGNOSIS — B35.6 JOCK ITCH: Primary | ICD-10-CM

## 2025-09-02 RX ORDER — FLUCONAZOLE 100 MG/1
100 TABLET ORAL
Qty: 3 TABLET | Refills: 0 | Status: SHIPPED | OUTPATIENT
Start: 2025-09-02 | End: 2025-09-09

## (undated) DEVICE — KIT SURGICAL COLON .25 1.1OZ

## (undated) DEVICE — MANIFOLD 4 PORT

## (undated) DEVICE — SOL IRRI STRL WATER 1000ML